# Patient Record
Sex: FEMALE | Race: WHITE | Employment: PART TIME | URBAN - METROPOLITAN AREA
[De-identification: names, ages, dates, MRNs, and addresses within clinical notes are randomized per-mention and may not be internally consistent; named-entity substitution may affect disease eponyms.]

---

## 2017-01-24 ENCOUNTER — OFFICE VISIT (OUTPATIENT)
Dept: INTERNAL MEDICINE CLINIC | Facility: CLINIC | Age: 37
End: 2017-01-24

## 2017-01-24 VITALS
WEIGHT: 152 LBS | DIASTOLIC BLOOD PRESSURE: 78 MMHG | BODY MASS INDEX: 23.86 KG/M2 | RESPIRATION RATE: 12 BRPM | TEMPERATURE: 98 F | HEIGHT: 67 IN | HEART RATE: 68 BPM | SYSTOLIC BLOOD PRESSURE: 112 MMHG

## 2017-01-24 DIAGNOSIS — L98.9 SKIN LESION: Primary | ICD-10-CM

## 2017-01-24 PROCEDURE — 99213 OFFICE O/P EST LOW 20 MIN: CPT | Performed by: INTERNAL MEDICINE

## 2017-01-24 NOTE — PROGRESS NOTES
Patient presents with:  Black Spots: on face pt states their becoming more noticeable. HPI:  Here for skin changes on face. Pt notes 2 increasing size and color skin lesions above left eye and on right face.    Pt has hx of crohn's and has been on immu skin lesions of face with normal borders light pigment, but are new per pt. A/P:    Skin lesion  (primary encounter diagnosis)  See dr Mik Cordero for eval  No orders of the defined types were placed in this encounter.        Meds & Refills for this Visi

## 2017-05-01 RX ORDER — CYANOCOBALAMIN 1000 UG/ML
INJECTION INTRAMUSCULAR; SUBCUTANEOUS
Qty: 1 ML | Refills: 0 | Status: SHIPPED | OUTPATIENT
Start: 2017-05-01 | End: 2017-05-29

## 2017-05-01 NOTE — TELEPHONE ENCOUNTER
LOV-1/24/17 AS  FOV-none  LAST RX-11/01/16 1ml 5 refills  LAST LABS-12/20/16  PER PROTOCOL- to provider

## 2017-05-16 ENCOUNTER — TELEPHONE (OUTPATIENT)
Dept: INTERNAL MEDICINE CLINIC | Facility: CLINIC | Age: 37
End: 2017-05-16

## 2017-05-16 ENCOUNTER — OFFICE VISIT (OUTPATIENT)
Dept: INTERNAL MEDICINE CLINIC | Facility: CLINIC | Age: 37
End: 2017-05-16

## 2017-05-16 ENCOUNTER — HOSPITAL ENCOUNTER (OUTPATIENT)
Dept: GENERAL RADIOLOGY | Facility: HOSPITAL | Age: 37
Discharge: HOME | End: 2017-05-16
Attending: INTERNAL MEDICINE
Payer: COMMERCIAL

## 2017-05-16 ENCOUNTER — LAB ENCOUNTER (OUTPATIENT)
Dept: LAB | Facility: HOSPITAL | Age: 37
End: 2017-05-16
Attending: INTERNAL MEDICINE
Payer: COMMERCIAL

## 2017-05-16 VITALS
HEART RATE: 64 BPM | RESPIRATION RATE: 16 BRPM | WEIGHT: 152 LBS | TEMPERATURE: 98 F | OXYGEN SATURATION: 98 % | BODY MASS INDEX: 23.86 KG/M2 | HEIGHT: 67 IN | DIASTOLIC BLOOD PRESSURE: 62 MMHG | SYSTOLIC BLOOD PRESSURE: 104 MMHG

## 2017-05-16 DIAGNOSIS — R06.02 SOB (SHORTNESS OF BREATH): Primary | ICD-10-CM

## 2017-05-16 DIAGNOSIS — R06.02 SOB (SHORTNESS OF BREATH): ICD-10-CM

## 2017-05-16 DIAGNOSIS — K51.919 ULCERATIVE COLITIS WITH COMPLICATION, UNSPECIFIED LOCATION (HCC): ICD-10-CM

## 2017-05-16 PROCEDURE — 85025 COMPLETE CBC W/AUTO DIFF WBC: CPT

## 2017-05-16 PROCEDURE — 85378 FIBRIN DEGRADE SEMIQUANT: CPT

## 2017-05-16 PROCEDURE — 36415 COLL VENOUS BLD VENIPUNCTURE: CPT

## 2017-05-16 PROCEDURE — 99214 OFFICE O/P EST MOD 30 MIN: CPT | Performed by: INTERNAL MEDICINE

## 2017-05-16 PROCEDURE — 93000 ELECTROCARDIOGRAM COMPLETE: CPT | Performed by: INTERNAL MEDICINE

## 2017-05-16 PROCEDURE — 71020 XR CHEST PA + LAT CHEST (CPT=71020): CPT | Performed by: INTERNAL MEDICINE

## 2017-05-16 PROCEDURE — 80053 COMPREHEN METABOLIC PANEL: CPT

## 2017-05-16 NOTE — TELEPHONE ENCOUNTER
Two episodes of SOB, Sunday and this morning, starting today with spasmotic coughing. Trouble catching her breath, and brief prickly feeling across the chest that went away. This has not happened before. No SOB or wheeze noted during our conversation.

## 2017-05-16 NOTE — PROGRESS NOTES
Patient presents with:  Pain: Pt. c/o discomfort in her chest that radiates down her back, SOB x 3 days      HPI:  Here for episode of chest tightness that radiated down her back that lasted moments on Sunday after she stood up quickly at a baseball game t No  Constitutional: Oriented to person, place, and time. No distress. HEENT:  Normocephalic and atraumatic. TM's wnl. Nose normal. Oropharynx is clear and moist.   Eyes: Conjunctivae wnl. Neck: Normal range of motion. Neck supple.  Normal carotid pulses

## 2017-05-18 ENCOUNTER — MED REC SCAN ONLY (OUTPATIENT)
Dept: INTERNAL MEDICINE CLINIC | Facility: CLINIC | Age: 37
End: 2017-05-18

## 2017-05-26 ENCOUNTER — HOSPITAL ENCOUNTER (OUTPATIENT)
Age: 37
Discharge: HOME | End: 2017-05-26
Attending: EMERGENCY MEDICINE
Payer: COMMERCIAL

## 2017-05-26 VITALS
HEIGHT: 67 IN | TEMPERATURE: 98 F | RESPIRATION RATE: 16 BRPM | BODY MASS INDEX: 22.76 KG/M2 | HEART RATE: 69 BPM | OXYGEN SATURATION: 97 % | DIASTOLIC BLOOD PRESSURE: 68 MMHG | WEIGHT: 145 LBS | SYSTOLIC BLOOD PRESSURE: 107 MMHG

## 2017-05-26 DIAGNOSIS — S61.211A LACERATION OF LEFT INDEX FINGER: Primary | ICD-10-CM

## 2017-05-26 PROCEDURE — 99213 OFFICE O/P EST LOW 20 MIN: CPT

## 2017-05-26 PROCEDURE — 90471 IMMUNIZATION ADMIN: CPT

## 2017-05-26 NOTE — ED PROVIDER NOTES
Patient Seen in: [facility]    History   Patient presents with:  Laceration Abrasion (integumentary)    Stated Complaint: left little finger cut x 1 day    HPI    26-year-old white female complaining of left fifth finger la Smokeless Status: Never Used                        Alcohol Use: Yes           1.0 - 1.5 oz/week       2-3 Cans of beer per week       Comment: 1/24/17      Review of Systems    Positive for stated complaint: left little finger cut x 1 day  Other systems a

## 2017-05-30 RX ORDER — CYANOCOBALAMIN 1000 UG/ML
INJECTION INTRAMUSCULAR; SUBCUTANEOUS
Qty: 1 ML | Refills: 0 | Status: SHIPPED | OUTPATIENT
Start: 2017-05-30 | End: 2017-09-07

## 2017-05-30 NOTE — TELEPHONE ENCOUNTER
LOV: 1/24/17  Future office visit: No upcoming visit   Last labs: 5/16/17 Cmp Cbc   Last RX: 5/1/17 #1mL  Per protocol: Route to provider

## 2017-06-13 ENCOUNTER — HOSPITAL ENCOUNTER (OUTPATIENT)
Dept: CV DIAGNOSTICS | Age: 37
Discharge: HOME | End: 2017-06-13
Attending: INTERNAL MEDICINE
Payer: COMMERCIAL

## 2017-06-13 DIAGNOSIS — K51.919 ULCERATIVE COLITIS WITH COMPLICATION, UNSPECIFIED LOCATION (HCC): ICD-10-CM

## 2017-06-13 DIAGNOSIS — R06.02 SOB (SHORTNESS OF BREATH): ICD-10-CM

## 2017-06-13 PROCEDURE — 93306 TTE W/DOPPLER COMPLETE: CPT | Performed by: INTERNAL MEDICINE

## 2017-07-03 NOTE — TELEPHONE ENCOUNTER
LOV: 5/16/17  Future office visit: No upcoming visit   Last labs: 5/16/17 Cmp Cbc   Last RX: 5/30/17 #1 mL No Refills  Per protocol: Route to provider

## 2017-07-05 RX ORDER — CYANOCOBALAMIN 1000 UG/ML
INJECTION INTRAMUSCULAR; SUBCUTANEOUS
Qty: 1 ML | Refills: 0 | Status: SHIPPED | OUTPATIENT
Start: 2017-07-05 | End: 2019-03-12

## 2017-08-02 RX ORDER — CYANOCOBALAMIN 1000 UG/ML
INJECTION INTRAMUSCULAR; SUBCUTANEOUS
Qty: 1 ML | Refills: 0 | Status: SHIPPED | OUTPATIENT
Start: 2017-08-02 | End: 2018-01-10

## 2017-08-10 ENCOUNTER — MED REC SCAN ONLY (OUTPATIENT)
Dept: INTERNAL MEDICINE CLINIC | Facility: CLINIC | Age: 37
End: 2017-08-10

## 2017-09-07 RX ORDER — CYANOCOBALAMIN 1000 UG/ML
INJECTION INTRAMUSCULAR; SUBCUTANEOUS
Qty: 4 ML | Refills: 0 | Status: SHIPPED | OUTPATIENT
Start: 2017-09-07 | End: 2018-05-14

## 2017-09-07 NOTE — TELEPHONE ENCOUNTER
Medication(s) to Refill:   Pending Prescriptions Disp Refills    cyanocobalamin 1000 MCG/ML Injection Solution 1 mL 0         Last Time Medication was Filled:  8-2-17 1 ml with no refills    Last Office Visit with PCP: 5/16/2017    When Patient was Due [facility]

## 2017-12-30 ENCOUNTER — HOSPITAL ENCOUNTER (OUTPATIENT)
Age: 37
Discharge: HOME | End: 2017-12-30
Attending: FAMILY MEDICINE
Payer: COMMERCIAL

## 2017-12-30 VITALS
RESPIRATION RATE: 18 BRPM | HEART RATE: 87 BPM | WEIGHT: 140 LBS | DIASTOLIC BLOOD PRESSURE: 82 MMHG | TEMPERATURE: 99 F | HEIGHT: 67 IN | OXYGEN SATURATION: 96 % | SYSTOLIC BLOOD PRESSURE: 121 MMHG | BODY MASS INDEX: 21.97 KG/M2

## 2017-12-30 DIAGNOSIS — R05.8 SPASMODIC COUGH: ICD-10-CM

## 2017-12-30 DIAGNOSIS — J11.1 FLU SYNDROME: Primary | ICD-10-CM

## 2017-12-30 PROCEDURE — 99213 OFFICE O/P EST LOW 20 MIN: CPT

## 2017-12-30 PROCEDURE — 99214 OFFICE O/P EST MOD 30 MIN: CPT

## 2017-12-30 RX ORDER — CODEINE PHOSPHATE AND GUAIFENESIN 10; 100 MG/5ML; MG/5ML
10 SOLUTION ORAL NIGHTLY PRN
Qty: 70 ML | Refills: 0 | Status: SHIPPED | OUTPATIENT
Start: 2017-12-30 | End: 2018-01-06

## 2017-12-30 RX ORDER — OSELTAMIVIR PHOSPHATE 75 MG/1
75 CAPSULE ORAL 2 TIMES DAILY
Qty: 10 CAPSULE | Refills: 0 | Status: SHIPPED | OUTPATIENT
Start: 2017-12-30 | End: 2018-01-04

## 2017-12-30 NOTE — ED INITIAL ASSESSMENT (HPI)
The patient is here with complaints of a cough and bilateral ear discomfort x 2 days. She states last night she woke up with a fever and chills but didn't take here temperature.   The cough is dry but she feels like she needs to bring mucus up but then has

## 2017-12-30 NOTE — ED PROVIDER NOTES
Patient Seen in: [facility]    History   Patient presents with:  Cough/URI  Ear Problem Pain (neurosensory)    Stated Complaint: Cough + ear pain x 2 days    HPI  43 yo F with hx of IBD here with complaints of cough and ear Pulse 87   Temp 99 °F (37.2 °C) (Temporal)   Resp 18   Ht 170.2 cm (5' 7\")   Wt 63.5 kg   LMP 12/04/2017 (Exact Date)   SpO2 96%   BMI 21.93 kg/m²     Physical Exam  GEN: Not in any acute distress, making good conversation, answering appropriately   SKIN least 5380-8154CT per day   Complications including the development of pneumonia, dehydration and PO intolerance discussed and if so to return immediately / go to the ER  Emphasized follow up with PMD in the next 5-7 days             Disposition and Plan

## 2018-01-10 ENCOUNTER — OFFICE VISIT (OUTPATIENT)
Dept: INTERNAL MEDICINE CLINIC | Facility: CLINIC | Age: 38
End: 2018-01-10

## 2018-01-10 VITALS
SYSTOLIC BLOOD PRESSURE: 108 MMHG | BODY MASS INDEX: 23.54 KG/M2 | OXYGEN SATURATION: 97 % | DIASTOLIC BLOOD PRESSURE: 76 MMHG | HEIGHT: 67 IN | WEIGHT: 150 LBS | HEART RATE: 78 BPM | TEMPERATURE: 98 F | RESPIRATION RATE: 16 BRPM

## 2018-01-10 DIAGNOSIS — J06.9 ACUTE URI: Primary | ICD-10-CM

## 2018-01-10 DIAGNOSIS — R05.9 COUGH: ICD-10-CM

## 2018-01-10 PROCEDURE — 99213 OFFICE O/P EST LOW 20 MIN: CPT | Performed by: NURSE PRACTITIONER

## 2018-01-10 RX ORDER — AMOXICILLIN AND CLAVULANATE POTASSIUM 875; 125 MG/1; MG/1
1 TABLET, FILM COATED ORAL 2 TIMES DAILY
Qty: 20 TABLET | Refills: 0 | Status: SHIPPED | OUTPATIENT
Start: 2018-01-10 | End: 2018-01-20

## 2018-01-10 NOTE — PROGRESS NOTES
Jean Luu is a 44year old female. Patient presents with:  Cough: Room 11. Congestion , body aches. HPI:   Presents for UC follow up   Seen 12/30 for cough and congestion. Given tamiflu and cheratussin ac   Not formally tested.    She is still oz/week     Cans of beer: 2 - 3 per week    Family History   Problem Relation Age of Onset   • Other [OTHER] Mother      autoimmune ITB   • Idiopathic Thrombocytopenic Purpura [OTHER] Mother    • Cancer Paternal Grandmother      Cervical   • Ovarian Cancer

## 2018-01-24 LAB
ABSOLUTE BASOPHILS: 21 CELLS/UL (ref 0–200)
ABSOLUTE EOSINOPHILS: 29 CELLS/UL (ref 15–500)
ABSOLUTE LYMPHOCYTES: 857 CELLS/UL (ref 850–3900)
ABSOLUTE MONOCYTES: 373 CELLS/UL (ref 200–950)
ABSOLUTE NEUTROPHILS: 2821 CELLS/UL (ref 1500–7800)
ALBUMIN/GLOBULIN RATIO: 1.8 (CALC) (ref 1–2.5)
ALBUMIN: 4.3 G/DL (ref 3.6–5.1)
ALKALINE PHOSPHATASE: 41 U/L (ref 33–115)
ALT: 11 U/L (ref 6–29)
AST: 18 U/L (ref 10–30)
BASOPHILS: 0.5 %
BILIRUBIN, TOTAL: 0.8 MG/DL (ref 0.2–1.2)
BUN: 17 MG/DL (ref 7–25)
CALCIUM: 9.3 MG/DL (ref 8.6–10.2)
CARBON DIOXIDE: 23 MMOL/L (ref 20–31)
CHLORIDE: 102 MMOL/L (ref 98–110)
CREATININE: 0.63 MG/DL (ref 0.5–1.1)
EGFR IF AFRICN AM: 132 ML/MIN/1.73M2
EGFR IF NONAFRICN AM: 114 ML/MIN/1.73M2
EOSINOPHILS: 0.7 %
GLOBULIN: 2.4 G/DL (CALC) (ref 1.9–3.7)
GLUCOSE: 85 MG/DL (ref 65–99)
HEMATOCRIT: 34.7 % (ref 35–45)
HEMOGLOBIN: 11.6 G/DL (ref 11.7–15.5)
LYMPHOCYTES: 20.9 %
MCH: 28.4 PG (ref 27–33)
MCHC: 33.4 G/DL (ref 32–36)
MCV: 85 FL (ref 80–100)
MONOCYTES: 9.1 %
MPV: 10.1 FL (ref 7.5–12.5)
NEUTROPHILS: 68.8 %
PLATELET COUNT: 245 THOUSAND/UL (ref 140–400)
POTASSIUM: 4.1 MMOL/L (ref 3.5–5.3)
PROTEIN, TOTAL: 6.7 G/DL (ref 6.1–8.1)
RDW: 13.6 % (ref 11–15)
RED BLOOD CELL COUNT: 4.08 MILLION/UL (ref 3.8–5.1)
SODIUM: 136 MMOL/L (ref 135–146)
WHITE BLOOD CELL COUNT: 4.1 THOUSAND/UL (ref 3.8–10.8)

## 2018-04-10 ENCOUNTER — PATIENT OUTREACH (OUTPATIENT)
Dept: INTERNAL MEDICINE CLINIC | Facility: CLINIC | Age: 38
End: 2018-04-10

## 2018-05-14 ENCOUNTER — OFFICE VISIT (OUTPATIENT)
Dept: OBGYN CLINIC | Facility: CLINIC | Age: 38
End: 2018-05-14

## 2018-05-14 VITALS
BODY MASS INDEX: 23.7 KG/M2 | WEIGHT: 151 LBS | SYSTOLIC BLOOD PRESSURE: 112 MMHG | DIASTOLIC BLOOD PRESSURE: 58 MMHG | HEIGHT: 67 IN

## 2018-05-14 DIAGNOSIS — Z12.4 SCREENING FOR MALIGNANT NEOPLASM OF CERVIX: ICD-10-CM

## 2018-05-14 DIAGNOSIS — Z30.40 ENCOUNTER FOR SURVEILLANCE OF CONTRACEPTIVES, UNSPECIFIED CONTRACEPTIVE: ICD-10-CM

## 2018-05-14 DIAGNOSIS — Z01.419 WELL WOMAN EXAM WITH ROUTINE GYNECOLOGICAL EXAM: Primary | ICD-10-CM

## 2018-05-14 DIAGNOSIS — N60.02 BREAST CYST, LEFT: ICD-10-CM

## 2018-05-14 PROCEDURE — 87624 HPV HI-RISK TYP POOLED RSLT: CPT | Performed by: OBSTETRICS & GYNECOLOGY

## 2018-05-14 PROCEDURE — 88175 CYTOPATH C/V AUTO FLUID REDO: CPT | Performed by: OBSTETRICS & GYNECOLOGY

## 2018-05-14 PROCEDURE — 99395 PREV VISIT EST AGE 18-39: CPT | Performed by: OBSTETRICS & GYNECOLOGY

## 2018-05-14 NOTE — PROGRESS NOTES
Beckie Allen is a 44year old female [id number] Patient's last menstrual period was 04/28/2018 (exact date). Patient presents with:  Wellness Visit  .      She had the Mirena with much improvement in her periods however she had elevated liver function tests N/A    Years of education: N/A  Number of children: N/A     Occupational History  None on file     Social History Main Topics   Smoking status: Never Smoker    Smokeless tobacco: Never Used    Alcohol use Yes  1.0 - 1.5 oz/week    2 - 3 Cans of beer per we discharge, vaginal itching  Skin/Breast:  Denies any breast pain, lumps, or discharge. Neurological:  denies headaches, extremity weakness or numbness.       PHYSICAL EXAM:   Constitutional: well developed, well nourished  Head/Face: normocephalic  Neck/T

## 2018-05-15 ENCOUNTER — TELEPHONE (OUTPATIENT)
Dept: INTERNAL MEDICINE CLINIC | Facility: CLINIC | Age: 38
End: 2018-05-15

## 2018-05-15 DIAGNOSIS — Z13.220 SCREENING FOR LIPOID DISORDERS: ICD-10-CM

## 2018-05-15 DIAGNOSIS — Z00.00 ROUTINE GENERAL MEDICAL EXAMINATION AT A HEALTH CARE FACILITY: Primary | ICD-10-CM

## 2018-05-15 DIAGNOSIS — Z13.29 SCREENING FOR THYROID DISORDER: ICD-10-CM

## 2018-05-15 NOTE — TELEPHONE ENCOUNTER
CPE   Future Appointments  Date Time Provider Tala Conley   5/17/2018 2:20 PM [facility] RM1 [address] [facility]   6/30/2018 7:30 AM Lamine Bose MD [facility] [facility]     Orders to [address] Herb Wallace aware must fast no call back required

## 2018-05-23 ENCOUNTER — TELEPHONE (OUTPATIENT)
Dept: OBGYN CLINIC | Facility: CLINIC | Age: 38
End: 2018-05-23

## 2018-05-23 NOTE — TELEPHONE ENCOUNTER
Heather Owens from Radiology called - needs corrected mammogram order, should be Bilateral diagnosis with Ultrasound

## 2018-05-23 NOTE — TELEPHONE ENCOUNTER
Radiology informed that per Dr. Autumn Lovett order only left breast diagnostic mammogram needs to be done due to a problem patient is having.

## 2018-05-25 ENCOUNTER — HOSPITAL ENCOUNTER (OUTPATIENT)
Dept: MAMMOGRAPHY | Facility: HOSPITAL | Age: 38
Discharge: HOME | End: 2018-05-25
Attending: OBSTETRICS & GYNECOLOGY
Payer: COMMERCIAL

## 2018-05-25 DIAGNOSIS — N60.02 BREAST CYST, LEFT: ICD-10-CM

## 2018-05-25 PROCEDURE — 77065 DX MAMMO INCL CAD UNI: CPT | Performed by: OBSTETRICS & GYNECOLOGY

## 2018-05-25 PROCEDURE — 76641 ULTRASOUND BREAST COMPLETE: CPT | Performed by: OBSTETRICS & GYNECOLOGY

## 2018-05-25 PROCEDURE — 77061 BREAST TOMOSYNTHESIS UNI: CPT | Performed by: OBSTETRICS & GYNECOLOGY

## 2018-05-29 NOTE — PROGRESS NOTES
Patient aware of results and recommendations to follow up with Dr. Mercedez Washington. Patient declines at this time. Patient instructed to call office if she changes her decision.  Patient verbalizes understanding

## 2018-05-30 NOTE — TELEPHONE ENCOUNTER
From: Damien Falk  Sent: 5/30/2018 2:12 PM CDT  Subject: Medication Renewal Request    Damien Mcgowan.  Deya would like a refill of the following medications:     BD LUER-MICHAEL SYRINGE 25G X 1\" 3 ML Does not apply Misc Tomasa Davey MD]    Preferred pharmac

## 2018-06-22 RX ORDER — CYANOCOBALAMIN 1000 UG/ML
INJECTION INTRAMUSCULAR; SUBCUTANEOUS
Qty: 4 ML | Refills: 0 | Status: SHIPPED | OUTPATIENT
Start: 2018-06-22 | End: 2018-11-02

## 2018-06-22 NOTE — TELEPHONE ENCOUNTER
LOV: 5/16/17 AS   Future office visit: 6/30/18 AS   Last labs: 1/23/18 Cmp Cbc   Last RX: Cyano 7/5/17 #1mL No Refills   Per protocol: Route to provider

## 2018-06-27 ENCOUNTER — LAB ENCOUNTER (OUTPATIENT)
Dept: LAB | Age: 38
End: 2018-06-27
Attending: NURSE PRACTITIONER
Payer: COMMERCIAL

## 2018-06-27 DIAGNOSIS — E53.8 LOW VITAMIN B12 LEVEL: ICD-10-CM

## 2018-06-27 DIAGNOSIS — Z00.00 ROUTINE GENERAL MEDICAL EXAMINATION AT A HEALTH CARE FACILITY: ICD-10-CM

## 2018-06-27 DIAGNOSIS — K75.4 AUTOIMMUNE HEPATITIS (HCC): ICD-10-CM

## 2018-06-27 DIAGNOSIS — K51.50 LEFT SIDED ULCERATIVE COLITIS WITHOUT COMPLICATION (HCC): ICD-10-CM

## 2018-06-27 DIAGNOSIS — Z13.29 SCREENING FOR THYROID DISORDER: ICD-10-CM

## 2018-06-27 DIAGNOSIS — Z13.220 SCREENING FOR LIPOID DISORDERS: ICD-10-CM

## 2018-06-27 PROCEDURE — 82607 VITAMIN B-12: CPT | Performed by: INTERNAL MEDICINE

## 2018-06-27 PROCEDURE — 80061 LIPID PANEL: CPT | Performed by: INTERNAL MEDICINE

## 2018-06-27 PROCEDURE — 84443 ASSAY THYROID STIM HORMONE: CPT | Performed by: INTERNAL MEDICINE

## 2018-06-30 ENCOUNTER — OFFICE VISIT (OUTPATIENT)
Dept: INTERNAL MEDICINE CLINIC | Facility: CLINIC | Age: 38
End: 2018-06-30

## 2018-06-30 VITALS
HEIGHT: 67 IN | HEART RATE: 64 BPM | BODY MASS INDEX: 24.01 KG/M2 | WEIGHT: 153 LBS | RESPIRATION RATE: 16 BRPM | DIASTOLIC BLOOD PRESSURE: 60 MMHG | SYSTOLIC BLOOD PRESSURE: 114 MMHG

## 2018-06-30 DIAGNOSIS — Z00.00 PE (PHYSICAL EXAM), ANNUAL: Primary | ICD-10-CM

## 2018-06-30 DIAGNOSIS — K51.919 ULCERATIVE COLITIS WITH COMPLICATION, UNSPECIFIED LOCATION (HCC): ICD-10-CM

## 2018-06-30 DIAGNOSIS — E53.8 LOW VITAMIN B12 LEVEL: ICD-10-CM

## 2018-06-30 DIAGNOSIS — D84.9 IMMUNOSUPPRESSION (HCC): ICD-10-CM

## 2018-06-30 DIAGNOSIS — D72.829 LEUKOCYTOSIS, UNSPECIFIED TYPE: ICD-10-CM

## 2018-06-30 PROCEDURE — 99395 PREV VISIT EST AGE 18-39: CPT | Performed by: INTERNAL MEDICINE

## 2018-06-30 NOTE — PROGRESS NOTES
Patient presents with:  Physical      HPI:  Here for cpe. Has livan UC, b12 def. Review of Systems   Constitutional: Negative for fever, chills and fatigue. No distress.   HENT: Negative for hearing loss, congestion, sore throat, neck pain and dental p Comment: Complete  10/6/08: COLONOSCOPY      Comment: w/biopsy  Nitza Mitchell MD  5/8/15: COLONOSCOPY      Comment: DR. Layne Lopez Q2YRS  08/03/2017: COLONOSCOPY      Comment: Andrea Mathew repeat in 3 years   5/8/15: UPPER GI ENDOSCOPY PERFORME Influenza Vaccine, No Preserv, 3YR +                          11/07/2016      TDAP                  05/26/2017        Physical Exam  /60   Pulse 64   Resp 16   Ht 67\"   Wt 153 lb   LMP 06/10/2018   BMI 23.96 kg/m²   Constitutional: Oriented to understands the plan.

## 2018-11-02 RX ORDER — CYANOCOBALAMIN 1000 UG/ML
INJECTION INTRAMUSCULAR; SUBCUTANEOUS
Qty: 4 ML | Refills: 0 | Status: SHIPPED | OUTPATIENT
Start: 2018-11-02 | End: 2018-12-20

## 2018-11-02 NOTE — TELEPHONE ENCOUNTER
LOV: 6/30/18 w/ AS for CPE  FOV: None  Last labs: 6/27/18 CMP,CBC,TSH,LIPID,VIT B12  Last Refill: 6/22/18 qt:4 mL    Per protocol sent to provider

## 2018-12-28 ENCOUNTER — LAB ENCOUNTER (OUTPATIENT)
Dept: LAB | Age: 38
End: 2018-12-28
Attending: INTERNAL MEDICINE
Payer: COMMERCIAL

## 2018-12-28 DIAGNOSIS — R15.2 RECTAL URGENCY: ICD-10-CM

## 2018-12-28 DIAGNOSIS — R19.7 DIARRHEA, UNSPECIFIED TYPE: ICD-10-CM

## 2018-12-28 PROCEDURE — 87493 C DIFF AMPLIFIED PROBE: CPT

## 2019-01-02 ENCOUNTER — HOSPITAL ENCOUNTER (INPATIENT)
Facility: HOSPITAL | Age: 39
LOS: 4 days | Discharge: HOME | DRG: 386 | End: 2019-01-06
Attending: EMERGENCY MEDICINE | Admitting: HOSPITALIST
Payer: COMMERCIAL

## 2019-01-02 ENCOUNTER — APPOINTMENT (OUTPATIENT)
Dept: CT IMAGING | Facility: HOSPITAL | Age: 39
DRG: 386 | End: 2019-01-02
Attending: PHYSICIAN ASSISTANT
Payer: COMMERCIAL

## 2019-01-02 DIAGNOSIS — K56.7 ILEUS OF UNSPECIFIED TYPE (HCC): ICD-10-CM

## 2019-01-02 DIAGNOSIS — K51.919 EXACERBATION OF ULCERATIVE COLITIS WITH COMPLICATION (HCC): Primary | ICD-10-CM

## 2019-01-02 DIAGNOSIS — K51.811 OTHER ULCERATIVE COLITIS WITH RECTAL BLEEDING (HCC): ICD-10-CM

## 2019-01-02 PROBLEM — E87.6 HYPOKALEMIA: Status: ACTIVE | Noted: 2019-01-02

## 2019-01-02 PROBLEM — R79.89 AZOTEMIA: Status: ACTIVE | Noted: 2019-01-02

## 2019-01-02 LAB
ALBUMIN SERPL-MCNC: 3.6 G/DL (ref 3.1–4.5)
ALBUMIN/GLOB SERPL: 1 {RATIO} (ref 1–2)
ALP LIVER SERPL-CCNC: 28 U/L (ref 37–98)
ALT SERPL-CCNC: 14 U/L (ref 14–54)
ANION GAP SERPL CALC-SCNC: 6 MMOL/L (ref 0–18)
AST SERPL-CCNC: 13 U/L (ref 15–41)
BASOPHILS # BLD AUTO: 0.01 X10(3) UL (ref 0–0.1)
BASOPHILS NFR BLD AUTO: 0.1 %
BILIRUB SERPL-MCNC: 0.6 MG/DL (ref 0.1–2)
BILIRUB UR QL STRIP.AUTO: NEGATIVE
BUN BLD-MCNC: 14 MG/DL (ref 8–20)
BUN/CREAT SERPL: 21.2 (ref 10–20)
CALCIUM BLD-MCNC: 8.9 MG/DL (ref 8.3–10.3)
CHLORIDE SERPL-SCNC: 105 MMOL/L (ref 101–111)
CLARITY UR REFRACT.AUTO: CLEAR
CO2 SERPL-SCNC: 28 MMOL/L (ref 22–32)
COLOR UR AUTO: YELLOW
CREAT BLD-MCNC: 0.66 MG/DL (ref 0.55–1.02)
EOSINOPHIL # BLD AUTO: 0.02 X10(3) UL (ref 0–0.3)
EOSINOPHIL NFR BLD AUTO: 0.2 %
ERYTHROCYTE [DISTWIDTH] IN BLOOD BY AUTOMATED COUNT: 13.1 % (ref 11.5–16)
GLOBULIN PLAS-MCNC: 3.5 G/DL (ref 2.8–4.4)
GLUCOSE BLD-MCNC: 90 MG/DL (ref 70–99)
GLUCOSE UR STRIP.AUTO-MCNC: NEGATIVE MG/DL
HCT VFR BLD AUTO: 39.5 % (ref 34–50)
HGB BLD-MCNC: 13.3 G/DL (ref 12–16)
IMMATURE GRANULOCYTE COUNT: 0.05 X10(3) UL (ref 0–1)
IMMATURE GRANULOCYTE RATIO %: 0.5 %
KETONES UR STRIP.AUTO-MCNC: NEGATIVE MG/DL
LEUKOCYTE ESTERASE UR QL STRIP.AUTO: NEGATIVE
LYMPHOCYTES # BLD AUTO: 1.04 X10(3) UL (ref 0.9–4)
LYMPHOCYTES NFR BLD AUTO: 11.2 %
M PROTEIN MFR SERPL ELPH: 7.1 G/DL (ref 6.4–8.2)
MCH RBC QN AUTO: 30.9 PG (ref 27–33.2)
MCHC RBC AUTO-ENTMCNC: 33.7 G/DL (ref 31–37)
MCV RBC AUTO: 91.6 FL (ref 81–100)
MONOCYTES # BLD AUTO: 0.91 X10(3) UL (ref 0.1–1)
MONOCYTES NFR BLD AUTO: 9.8 %
NEUTROPHIL ABS PRELIM: 7.28 X10 (3) UL (ref 1.3–6.7)
NEUTROPHILS # BLD AUTO: 7.28 X10(3) UL (ref 1.3–6.7)
NEUTROPHILS NFR BLD AUTO: 78.2 %
NITRITE UR QL STRIP.AUTO: NEGATIVE
OSMOLALITY SERPL CALC.SUM OF ELEC: 288 MOSM/KG (ref 275–295)
PH UR STRIP.AUTO: 7 [PH] (ref 4.5–8)
PLATELET # BLD AUTO: 288 10(3)UL (ref 150–450)
POTASSIUM SERPL-SCNC: 3.5 MMOL/L (ref 3.6–5.1)
PROT UR STRIP.AUTO-MCNC: NEGATIVE MG/DL
RBC # BLD AUTO: 4.31 X10(6)UL (ref 3.8–5.1)
RBC UR QL AUTO: NEGATIVE
RED CELL DISTRIBUTION WIDTH-SD: 44 FL (ref 35.1–46.3)
SODIUM SERPL-SCNC: 139 MMOL/L (ref 136–144)
SP GR UR STRIP.AUTO: 1.01 (ref 1–1.03)
UROBILINOGEN UR STRIP.AUTO-MCNC: <2 MG/DL
WBC # BLD AUTO: 9.3 X10(3) UL (ref 4–13)

## 2019-01-02 PROCEDURE — 99223 1ST HOSP IP/OBS HIGH 75: CPT | Performed by: HOSPITALIST

## 2019-01-02 PROCEDURE — 74177 CT ABD & PELVIS W/CONTRAST: CPT | Performed by: PHYSICIAN ASSISTANT

## 2019-01-02 RX ORDER — BALSALAZIDE DISODIUM 750 MG/1
2250 CAPSULE ORAL 2 TIMES DAILY
Status: DISCONTINUED | OUTPATIENT
Start: 2019-01-02 | End: 2019-01-03 | Stop reason: SDUPTHER

## 2019-01-02 RX ORDER — SODIUM CHLORIDE 9 MG/ML
INJECTION, SOLUTION INTRAVENOUS CONTINUOUS
Status: DISCONTINUED | OUTPATIENT
Start: 2019-01-02 | End: 2019-01-04

## 2019-01-02 RX ORDER — ACETAMINOPHEN 160 MG/5ML
650 SOLUTION ORAL EVERY 6 HOURS PRN
Status: DISCONTINUED | OUTPATIENT
Start: 2019-01-02 | End: 2019-01-02

## 2019-01-02 RX ORDER — AZATHIOPRINE 50 MG/1
75 TABLET ORAL DAILY
Status: DISCONTINUED | OUTPATIENT
Start: 2019-01-02 | End: 2019-01-03 | Stop reason: SDUPTHER

## 2019-01-02 RX ORDER — HYDROCORTISONE 100 MG/60ML
100 SUSPENSION RECTAL NIGHTLY
Status: DISCONTINUED | OUTPATIENT
Start: 2019-01-02 | End: 2019-01-03

## 2019-01-02 RX ORDER — ENOXAPARIN SODIUM 100 MG/ML
40 INJECTION SUBCUTANEOUS DAILY
Status: DISCONTINUED | OUTPATIENT
Start: 2019-01-03 | End: 2019-01-06

## 2019-01-02 RX ORDER — ENOXAPARIN SODIUM 100 MG/ML
40 INJECTION SUBCUTANEOUS DAILY
Status: DISCONTINUED | OUTPATIENT
Start: 2019-01-03 | End: 2019-01-02

## 2019-01-02 RX ORDER — ACETAMINOPHEN 325 MG/1
650 TABLET ORAL EVERY 6 HOURS PRN
Status: DISCONTINUED | OUTPATIENT
Start: 2019-01-02 | End: 2019-01-06

## 2019-01-02 NOTE — ED INITIAL ASSESSMENT (HPI)
C/o colitis flare up. Has been trying to manage at home but has had no relief. Sent here for IV steroids.

## 2019-01-03 ENCOUNTER — ANESTHESIA EVENT (OUTPATIENT)
Dept: ENDOSCOPY | Facility: HOSPITAL | Age: 39
End: 2019-01-03

## 2019-01-03 LAB
ANION GAP SERPL CALC-SCNC: 6 MMOL/L (ref 0–18)
BUN BLD-MCNC: 10 MG/DL (ref 8–20)
BUN/CREAT SERPL: 15.6 (ref 10–20)
CALCIUM BLD-MCNC: 8.3 MG/DL (ref 8.3–10.3)
CHLORIDE SERPL-SCNC: 107 MMOL/L (ref 101–111)
CO2 SERPL-SCNC: 28 MMOL/L (ref 22–32)
CREAT BLD-MCNC: 0.64 MG/DL (ref 0.55–1.02)
CRP SERPL-MCNC: <0.29 MG/DL (ref ?–1)
GLUCOSE BLD-MCNC: 159 MG/DL (ref 70–99)
OSMOLALITY SERPL CALC.SUM OF ELEC: 294 MOSM/KG (ref 275–295)
POTASSIUM SERPL-SCNC: 3.9 MMOL/L (ref 3.6–5.1)
SODIUM SERPL-SCNC: 141 MMOL/L (ref 136–144)

## 2019-01-03 PROCEDURE — 99232 SBSQ HOSP IP/OBS MODERATE 35: CPT | Performed by: HOSPITALIST

## 2019-01-03 RX ORDER — AZATHIOPRINE 50 MG/1
75 TABLET ORAL DAILY
Status: DISCONTINUED | OUTPATIENT
Start: 2019-01-03 | End: 2019-01-05

## 2019-01-03 RX ORDER — SODIUM PHOSPHATE, DIBASIC AND SODIUM PHOSPHATE, MONOBASIC 7; 19 G/133ML; G/133ML
1 ENEMA RECTAL ONCE
Status: COMPLETED | OUTPATIENT
Start: 2019-01-04 | End: 2019-01-04

## 2019-01-03 RX ORDER — BALSALAZIDE DISODIUM 750 MG/1
3000 CAPSULE ORAL 2 TIMES DAILY
Status: DISCONTINUED | OUTPATIENT
Start: 2019-01-03 | End: 2019-01-06

## 2019-01-03 RX ORDER — DIPHENHYDRAMINE HYDROCHLORIDE 50 MG/ML
25 INJECTION INTRAMUSCULAR; INTRAVENOUS ONCE
Status: COMPLETED | OUTPATIENT
Start: 2019-01-03 | End: 2019-01-03

## 2019-01-03 RX ORDER — SODIUM PHOSPHATE, DIBASIC AND SODIUM PHOSPHATE, MONOBASIC 7; 19 G/133ML; G/133ML
1 ENEMA RECTAL ONCE
Status: COMPLETED | OUTPATIENT
Start: 2019-01-03 | End: 2019-01-03

## 2019-01-03 NOTE — H&P
[facility]  History and Physical     Lizette Falk Patient Status:  Inpatient    1980 MRN [id number]   [facility] 3NW-A Attending Nicole Molina MD   Hosp Day # 0 PCP Dodie Vargas MD     Chief Complaint: Bloody diarrhea alcohol per week. She reports that she does not use drugs.     Family History:   Family History   Problem Relation Age of Onset   • Other (Other) Mother         autoimmune ITB   • Other (Idiopathic Thrombocytopenic Purpura) Mother    • Cancer Paternal Rindge DAYS Disp: 1 mL Rfl: 0       Review of Systems:   A comprehensive 14 point review of systems was completed. Pertinent positives and negatives noted in the HPI.     Physical Exam:    /90   Pulse 53   Temp 98.2 °F (36.8 °C) (Temporal)   Resp 16   Ht

## 2019-01-03 NOTE — ED PROVIDER NOTES
Patient Seen in: [facility] Emergency Department    History   Patient presents with:  Abdomen/Flank Pain (GI/)    Stated Complaint: colitis flair up    HPI    Chuyita Pipre is a 39-year-old female presents today for evaluation of a colitis flare.   She h 3 Cans of beer per week    Drug use: No      Review of Systems    Positive for stated complaint: colitis flair up  Other systems are as noted in HPI. Constitutional and vital signs reviewed.       All other systems reviewed and negative except as noted abo Neutrophil Absolute Prelim 7.28 (*)     Neutrophil Absolute 7.28 (*)     All other components within normal limits   URINALYSIS WITH CULTURE REFLEX   CBC WITH DIFFERENTIAL WITH PLATELET    Narrative:      The following orders were created for panel order CB ductal dilatation, or atrophy. SPLEEN:  No enlargement or focal lesion. KIDNEYS:  No mass, obstruction, or calcification. ADRENALS:  No mass or enlargement. AORTA/VASCULAR:  No aneurysm or dissection. RETROPERITONEUM:  No mass or adenopathy.   BOWEL/ME

## 2019-01-03 NOTE — PROGRESS NOTES
[facility]  Progress Note     Merced Falk Patient Status:  Inpatient    1980 MRN [id number]   [facility] 3NW-A Attending Samantha Azar, [address] [facility] Day # 1 PCP Joe Dodge MD     Chief Complaint: Abdominal pain    S: Redia Lank ASSESSMENT / PLAN:     1. Abdominal pain presumed secondary to UC flare  1. CRP WNL  2. CT without acute findings  3.  Management per GI    Quality:  · DVT Prophylaxis: lovenox  · CODE status: full  · [place]: no  · Central line: no    Estimated date of

## 2019-01-03 NOTE — H&P (VIEW-ONLY)
[facility]                       Gastroenterology Consultation-[facility]    Jason Falk Patient Status:  Inpatient    1980 MRN [id number]   [facility] 3NW-A Attending Samantha Rainey, [address] [facility] Day # 1 PCP Katerina Fitch this a.m. following her second dose of IV steroids.   PMHx:   Past Medical History:   Diagnosis Date   • Abdominal pain    • Celiac disease    • Colitis    • Hemorrhage of rectum and anus    • Low vitamin B12 level 2014    monthly b12 injections   • Pap sme illness            Cardiovascular: No history of CAD, prior MI, chest pain, or palpitations            Respiratory: No shortness of breath, asthma, copd, recurrent pneumonia            Hematologic: The patient reports no easy bruising, frequent gum bleedin affect without obvious depression or anxiety  Labs: Lab Results   Component Value Date    WBC 9.3 01/02/2019    HGB 13.3 01/02/2019    HCT 39.5 01/02/2019    .0 01/02/2019    CREATSERUM 0.64 01/03/2019    BUN 10 01/03/2019     01/03/2019    K or enlargement. AORTA/VASCULAR:  No aneurysm or dissection. RETROPERITONEUM:  No mass or adenopathy. BOWEL/MESENTERY:  Normal caliber small and large bowel. Stool throughout the colon. Debris within the small bowel suggests stasis.   No free flui Gastroenterology  [phone]    Attending physician addendum:     Ricardo Quintanilla is a 43year-old female who is being seen in consultation for bloody diarrhea and history of ulcerative colitis.      I personally saw and examined the patient.  I agree with t

## 2019-01-03 NOTE — CONSULTS
[facility]                       [facility]    Colleen Blanchard Deya Patient Status:  Inpatient    1980 MRN [id number]   [facility] 3NW-A Attending Brittanie Nolen, [address] [facility] Day # 1 PCP Marianna Voss this a.m. following her second dose of IV steroids.   PMHx:   Past Medical History:   Diagnosis Date   • Abdominal pain    • Celiac disease    • Colitis    • Hemorrhage of rectum and anus    • Low vitamin B12 level 2014    monthly b12 injections   • Pap sme illness            Cardiovascular: No history of CAD, prior MI, chest pain, or palpitations            Respiratory: No shortness of breath, asthma, copd, recurrent pneumonia            Hematologic: The patient reports no easy bruising, frequent gum bleedin affect without obvious depression or anxiety  Labs: Lab Results   Component Value Date    WBC 9.3 01/02/2019    HGB 13.3 01/02/2019    HCT 39.5 01/02/2019    .0 01/02/2019    CREATSERUM 0.64 01/03/2019    BUN 10 01/03/2019     01/03/2019    K or enlargement. AORTA/VASCULAR:  No aneurysm or dissection. RETROPERITONEUM:  No mass or adenopathy. BOWEL/MESENTERY:  Normal caliber small and large bowel. Stool throughout the colon. Debris within the small bowel suggests stasis.   No free flui Gastroenterology  [phone]    Attending physician addendum:     Raúl Leon is a 43year-old female who is being seen in consultation for bloody diarrhea and history of ulcerative colitis.      I personally saw and examined the patient.  I agree with t

## 2019-01-03 NOTE — ANESTHESIA PREPROCEDURE EVALUATION
PRE-OP EVALUATION    Patient Name: Silvia Falk    Pre-op Diagnosis: Other ulcerative colitis with rectal bleeding ([facility].) [K51.811]    Procedure(s):   FLEXIBLE SIGMOIDOSCOPY    Surgeon(s) and Role:     * Adarsh Rico, DO - Primary    Pre-op vitals revi reviewed.     Anesthetic Complications  (-) history of anesthetic complications         GI/Hepatic/Renal                          (+) ulcerative colitis       Cardiovascular        Exercise tolerance: good     MET: >4 Airway      Mallampati: II  Mouth opening: >3 FB  TM distance: > 6 cm  Neck ROM: full Cardiovascular      Rhythm: regular  Rate: normal     Dental             Pulmonary      Breath sounds clear to auscultation bilaterally.                Other fi

## 2019-01-04 ENCOUNTER — ANESTHESIA (OUTPATIENT)
Dept: ENDOSCOPY | Facility: HOSPITAL | Age: 39
End: 2019-01-04

## 2019-01-04 LAB
ANION GAP SERPL CALC-SCNC: 5 MMOL/L (ref 0–18)
BASOPHILS # BLD AUTO: 0 X10(3) UL (ref 0–0.1)
BASOPHILS NFR BLD AUTO: 0 %
BUN BLD-MCNC: 9 MG/DL (ref 8–20)
BUN/CREAT SERPL: 15.5 (ref 10–20)
CALCIUM BLD-MCNC: 8.4 MG/DL (ref 8.3–10.3)
CHLORIDE SERPL-SCNC: 107 MMOL/L (ref 101–111)
CO2 SERPL-SCNC: 29 MMOL/L (ref 22–32)
CREAT BLD-MCNC: 0.58 MG/DL (ref 0.55–1.02)
EOSINOPHIL # BLD AUTO: 0 X10(3) UL (ref 0–0.3)
EOSINOPHIL NFR BLD AUTO: 0 %
ERYTHROCYTE [DISTWIDTH] IN BLOOD BY AUTOMATED COUNT: 13 % (ref 11.5–16)
GLUCOSE BLD-MCNC: 133 MG/DL (ref 70–99)
HAV IGM SER QL: 2.3 MG/DL (ref 1.8–2.5)
HCG URINE QUALITATIVE: NEGATIVE
HCT VFR BLD AUTO: 35.7 % (ref 34–50)
HGB BLD-MCNC: 11.8 G/DL (ref 12–16)
IMMATURE GRANULOCYTE COUNT: 0.03 X10(3) UL (ref 0–1)
IMMATURE GRANULOCYTE RATIO %: 0.5 %
LYMPHOCYTES # BLD AUTO: 0.92 X10(3) UL (ref 0.9–4)
LYMPHOCYTES NFR BLD AUTO: 15.2 %
MCH RBC QN AUTO: 29.9 PG (ref 27–33.2)
MCHC RBC AUTO-ENTMCNC: 33.1 G/DL (ref 31–37)
MCV RBC AUTO: 90.4 FL (ref 81–100)
MONOCYTES # BLD AUTO: 0.37 X10(3) UL (ref 0.1–1)
MONOCYTES NFR BLD AUTO: 6.1 %
NEUTROPHIL ABS PRELIM: 4.74 X10 (3) UL (ref 1.3–6.7)
NEUTROPHILS # BLD AUTO: 4.74 X10(3) UL (ref 1.3–6.7)
NEUTROPHILS NFR BLD AUTO: 78.2 %
OSMOLALITY SERPL CALC.SUM OF ELEC: 293 MOSM/KG (ref 275–295)
PLATELET # BLD AUTO: 227 10(3)UL (ref 150–450)
POTASSIUM SERPL-SCNC: 3.4 MMOL/L (ref 3.6–5.1)
POTASSIUM SERPL-SCNC: 3.6 MMOL/L (ref 3.6–5.1)
RBC # BLD AUTO: 3.95 X10(6)UL (ref 3.8–5.1)
RED CELL DISTRIBUTION WIDTH-SD: 42.8 FL (ref 35.1–46.3)
SODIUM SERPL-SCNC: 141 MMOL/L (ref 136–144)
WBC # BLD AUTO: 6.1 X10(3) UL (ref 4–13)

## 2019-01-04 PROCEDURE — 99232 SBSQ HOSP IP/OBS MODERATE 35: CPT | Performed by: HOSPITALIST

## 2019-01-04 PROCEDURE — 0DBN8ZX EXCISION OF SIGMOID COLON, VIA NATURAL OR ARTIFICIAL OPENING ENDOSCOPIC, DIAGNOSTIC: ICD-10-PCS | Performed by: INTERNAL MEDICINE

## 2019-01-04 RX ORDER — ARIPIPRAZOLE 15 MG/1
40 TABLET ORAL EVERY 4 HOURS
Status: DISCONTINUED | OUTPATIENT
Start: 2019-01-05 | End: 2019-01-05

## 2019-01-04 RX ORDER — TEMAZEPAM 15 MG/1
15 CAPSULE ORAL NIGHTLY PRN
Status: DISCONTINUED | OUTPATIENT
Start: 2019-01-04 | End: 2019-01-04

## 2019-01-04 RX ORDER — ACETAMINOPHEN 500 MG
TABLET ORAL
Status: COMPLETED
Start: 2019-01-04 | End: 2019-01-04

## 2019-01-04 RX ORDER — MELATONIN
3 NIGHTLY PRN
Status: DISCONTINUED | OUTPATIENT
Start: 2019-01-04 | End: 2019-01-06

## 2019-01-04 RX ORDER — HYDROCORTISONE 100 MG/60ML
1 SUSPENSION RECTAL 2 TIMES DAILY
Status: COMPLETED | OUTPATIENT
Start: 2019-01-04 | End: 2019-01-04

## 2019-01-04 RX ORDER — POTASSIUM CHLORIDE 20 MEQ/1
40 TABLET, EXTENDED RELEASE ORAL EVERY 4 HOURS
Status: COMPLETED | OUTPATIENT
Start: 2019-01-04 | End: 2019-01-04

## 2019-01-04 RX ORDER — ACETAMINOPHEN 500 MG
1000 TABLET ORAL ONCE
Status: COMPLETED | OUTPATIENT
Start: 2019-01-04 | End: 2019-01-04

## 2019-01-04 RX ORDER — HYDROCORTISONE 100 MG/60ML
1 SUSPENSION RECTAL 2 TIMES DAILY
Status: DISCONTINUED | OUTPATIENT
Start: 2019-01-04 | End: 2019-01-06

## 2019-01-04 NOTE — PROGRESS NOTES
[facility]  Progress Note     Cassidy Falk Patient Status:  Inpatient    1980 MRN [id number]   [facility] 3NW-A Attending Radha Fernandez, [address] [facility] Day # 2 PCP Vinay Jeffrey MD     Chief Complaint: Abdominal pain    S: [place] Disodium  3,000 mg Oral BID   • azathioprine  75 mg Oral Daily   • hydrocortisone Na succinate PF  100 mg Intravenous Q8H [address]   • enoxaparin  40 mg Subcutaneous Daily       ASSESSMENT / PLAN:     1. Abdominal pain presumed secondary to UC flare  1.  Flex si

## 2019-01-04 NOTE — OPERATIVE REPORT
[address] Patient Status:  Inpatient    1980 MRN [id number]   [facility] ENDOSCOPY Attending Kristina Collins, [address] [facility] Day # 2 PCP Ivan Sandhu MD       PREOPERATIVE DIAGNOSIS/INDICATION: History of Ulcerative Colitis pre [facility]

## 2019-01-04 NOTE — PLAN OF CARE
Pt a&o, VSS. On RA, no sob noted. IVF infusing. Active bowel sounds, loose BMs. Enema prior to bedtime, reported pain, tylenol given. Benadryl for sleep per . NPO status discussed, verbalized understanding. Denies any other needs at this time.

## 2019-01-04 NOTE — INTERVAL H&P NOTE
Pre-op Diagnosis: Other ulcerative colitis with rectal bleeding [facility] [K51.811]    The above referenced H&P was reviewed by Calvin Tilley DO on 1/4/2019, the patient was examined and no significant changes have occurred in the patient's condition since

## 2019-01-04 NOTE — PROGRESS NOTES
Patient returned from Flex Sigmoidoscopy. Tolerated procedure well. Patient to start full liquids. Denies nausea/emesis.

## 2019-01-04 NOTE — ANESTHESIA POSTPROCEDURE EVALUATION
[facility] Patient Status:  Inpatient   Age/Gender 43year old female MRN [id number]   Location [facility]. Attending Robby Dobbins, [address] [facility] Day # 2 PCP Miri Espinal MD       Anesthesia Post-op Note    Procedure(s):

## 2019-01-05 LAB — POTASSIUM SERPL-SCNC: 3.9 MMOL/L (ref 3.6–5.1)

## 2019-01-05 PROCEDURE — 99232 SBSQ HOSP IP/OBS MODERATE 35: CPT | Performed by: HOSPITALIST

## 2019-01-05 RX ORDER — AZATHIOPRINE 50 MG/1
75 TABLET ORAL NIGHTLY
Status: DISCONTINUED | OUTPATIENT
Start: 2019-01-05 | End: 2019-01-06

## 2019-01-05 RX ORDER — POTASSIUM CHLORIDE 20 MEQ/1
40 TABLET, EXTENDED RELEASE ORAL ONCE
Status: COMPLETED | OUTPATIENT
Start: 2019-01-05 | End: 2019-01-05

## 2019-01-05 RX ORDER — DIPHENHYDRAMINE HCL 25 MG
25 CAPSULE ORAL NIGHTLY PRN
Status: DISCONTINUED | OUTPATIENT
Start: 2019-01-05 | End: 2019-01-06

## 2019-01-05 NOTE — PLAN OF CARE
Upon reassessment this afternoon, VSS, HR 40's. Dr. Zoë Farrell aware, no new orders. Pt continues to tolerate full liquid diet without nausea and cramping. Denies abdominal pain.  Pt reports 2 loose, brown w/scant blood BM's since this am. IV steroid given as

## 2019-01-05 NOTE — PROGRESS NOTES
1953: Dr. Rajesh Bautista paged regarding hydrocortisone enemas. Order received to continue BID. Will implement.

## 2019-01-05 NOTE — PROGRESS NOTES
Gastroenterology Follow-Up Note      Miguelina Mariammitra Falk Patient Status:  Inpatient    1980 MRN [id number]   [facility] 3NW-A Attending Libia Jones, [address] [facility] Day # 3 PCP Juvenal Whittington MD     Chief Complaint/Reason for Follow Up: 3

## 2019-01-05 NOTE — PROGRESS NOTES
[facility]  Progress Note     Catalnio Carlee Falk Patient Status:  Inpatient    1980 MRN [id number]   [facility] 3NW-A Attending Michelle Pizarro, [address] [facility] Day # 3 PCP Michel Lee MD     Chief Complaint: Abdominal pain    S: Yessi Nicole results for input(s): PTP, INR in the last 168 hours. No results for input(s): TROP, CK in the last 168 hours. Imaging: Imaging data reviewed in Epic.     Medications:   • azathioprine  75 mg Oral Nightly   • hydrocortisone  1 enema Rectal BID

## 2019-01-06 VITALS
HEART RATE: 42 BPM | DIASTOLIC BLOOD PRESSURE: 79 MMHG | WEIGHT: 150 LBS | BODY MASS INDEX: 23.54 KG/M2 | HEIGHT: 67 IN | SYSTOLIC BLOOD PRESSURE: 131 MMHG | TEMPERATURE: 98 F | RESPIRATION RATE: 16 BRPM | OXYGEN SATURATION: 99 %

## 2019-01-06 LAB
CALPROTECTIN, FECAL: 910 UG/G
HBV CORE AB SERPL QL IA: NONREACTIVE
HBV SURFACE AB SER QL: NONREACTIVE
HBV SURFACE AB SERPL IA-ACNC: 7.53 MIU/ML
HBV SURFACE AG SER-ACNC: <0.1 [IU]/L
HBV SURFACE AG SERPL QL IA: NONREACTIVE

## 2019-01-06 PROCEDURE — 99232 SBSQ HOSP IP/OBS MODERATE 35: CPT | Performed by: HOSPITALIST

## 2019-01-06 RX ORDER — HYDROCORTISONE 100 MG/60ML
100 SUSPENSION RECTAL 2 TIMES DAILY
Qty: 60 ENEMA | Refills: 0 | Status: SHIPPED | OUTPATIENT
Start: 2019-01-06 | End: 2019-01-10

## 2019-01-06 RX ORDER — PREDNISONE 10 MG/1
10 TABLET ORAL DAILY
Qty: 70 TABLET | Refills: 0 | Status: SHIPPED | OUTPATIENT
Start: 2019-01-06 | End: 2019-03-12

## 2019-01-06 NOTE — PROGRESS NOTES
Gastroenterology Follow-Up Note      Miguelina Mariammitra Falk Patient Status:  Inpatient    1980 MRN [id number]   [facility] 3NW-A Attending Libia Jones, [address] [facility] Day # 4 PCP Juvenal Whittington MD     Chief Complaint/Reason for Follow Up: 3

## 2019-01-06 NOTE — PROGRESS NOTES
[facility] [address]  [place], [address]  ?  01/06/19  ? Re: Catalino Falk  ? To Whom It May Concern:    Catalino Falk was admitted to [facility] from 1/2/2019 to 01/06/19.     Please excuse Haider Chaney from attending work

## 2019-01-06 NOTE — DISCHARGE SUMMARY
[facility] [place] [facility]  DISCHARGE SUMMARY     Nancy Falk Patient Status:  Inpatient    1980 MRN [id number]   [facility] 3NW-A Attending Jaja Rodriguez, [address] [facility] Day # 4 PCP Yolanda Sauceda MD     Date of Admission: 2019  Date of Dis known as:  Alejandra Sky  What changed:  when to take this      Place 1 enema (100 mg total) rectally 2 (two) times daily.    Quantity:  60 enema  Refills:  0     predniSONE 10 MG Tabs  Commonly known as:  DELTASONE  What changed:    · medication strength  · ho Date Arrival Time Visit Type Length Department Provider     1/10/2019  1:40 PM  FOLLOW-UP OV [24066] 20 min.  [facility] Gastroenterology,  Tj Richey MD    Patient Instructions:      Please arrive 15 minutes prior to your scheduled appointment

## 2019-01-09 ENCOUNTER — TELEPHONE (OUTPATIENT)
Dept: INTERNAL MEDICINE CLINIC | Facility: CLINIC | Age: 39
End: 2019-01-09

## 2019-01-09 NOTE — TELEPHONE ENCOUNTER
Received medical records request from [facility]/Gastroenterology/Dr. Yahaira Gautam. Sent to [employer] for processing.

## 2019-01-10 LAB
M TB TUBERC IFN-G/MITOGEN IGNF BLD: 0 IU/ML
M TB TUBERC IFN-G/MITOGEN IGNF BLD: 0 IU/ML
M TB TUBERC IGNF/MITOGEN IGNF CONTROL: 0.15 IU/ML
MITOGEN IGNF BCKGRD COR BLD-ACNC: 0.02 IU/ML

## 2019-01-11 ENCOUNTER — HOSPITAL ENCOUNTER (OUTPATIENT)
Dept: GENERAL RADIOLOGY | Age: 39
Discharge: HOME | End: 2019-01-11
Attending: INTERNAL MEDICINE
Payer: COMMERCIAL

## 2019-01-11 ENCOUNTER — APPOINTMENT (OUTPATIENT)
Dept: LAB | Age: 39
End: 2019-01-11
Attending: INTERNAL MEDICINE
Payer: COMMERCIAL

## 2019-01-11 DIAGNOSIS — K51.011 ULCERATIVE PANCOLITIS WITH RECTAL BLEEDING (HCC): ICD-10-CM

## 2019-01-11 PROCEDURE — 86480 TB TEST CELL IMMUN MEASURE: CPT

## 2019-01-11 PROCEDURE — 71046 X-RAY EXAM CHEST 2 VIEWS: CPT | Performed by: INTERNAL MEDICINE

## 2019-01-11 PROCEDURE — 36415 COLL VENOUS BLD VENIPUNCTURE: CPT

## 2019-01-15 LAB
M TB TUBERC IFN-G/MITOGEN IGNF BLD: 0 IU/ML
M TB TUBERC IFN-G/MITOGEN IGNF BLD: 0 IU/ML
M TB TUBERC IGNF/MITOGEN IGNF CONTROL: 0.12 IU/ML
MITOGEN IGNF BCKGRD COR BLD-ACNC: 0.02 IU/ML

## 2019-02-02 ENCOUNTER — HOSPITAL ENCOUNTER (EMERGENCY)
Facility: HOSPITAL | Age: 39
Discharge: HOME | End: 2019-02-03
Attending: STUDENT IN AN ORGANIZED HEALTH CARE EDUCATION/TRAINING PROGRAM
Payer: COMMERCIAL

## 2019-02-02 ENCOUNTER — APPOINTMENT (OUTPATIENT)
Dept: GENERAL RADIOLOGY | Facility: HOSPITAL | Age: 39
End: 2019-02-02
Attending: STUDENT IN AN ORGANIZED HEALTH CARE EDUCATION/TRAINING PROGRAM
Payer: COMMERCIAL

## 2019-02-02 VITALS
DIASTOLIC BLOOD PRESSURE: 79 MMHG | TEMPERATURE: 99 F | HEIGHT: 67 IN | RESPIRATION RATE: 18 BRPM | HEART RATE: 80 BPM | SYSTOLIC BLOOD PRESSURE: 122 MMHG | BODY MASS INDEX: 22.76 KG/M2 | OXYGEN SATURATION: 96 % | WEIGHT: 145 LBS

## 2019-02-02 DIAGNOSIS — S52.124A CLOSED NONDISPLACED FRACTURE OF HEAD OF RIGHT RADIUS, INITIAL ENCOUNTER: Primary | ICD-10-CM

## 2019-02-02 PROCEDURE — 99284 EMERGENCY DEPT VISIT MOD MDM: CPT

## 2019-02-02 PROCEDURE — 73060 X-RAY EXAM OF HUMERUS: CPT | Performed by: STUDENT IN AN ORGANIZED HEALTH CARE EDUCATION/TRAINING PROGRAM

## 2019-02-02 PROCEDURE — 73080 X-RAY EXAM OF ELBOW: CPT | Performed by: STUDENT IN AN ORGANIZED HEALTH CARE EDUCATION/TRAINING PROGRAM

## 2019-02-02 PROCEDURE — 29105 APPLICATION LONG ARM SPLINT: CPT

## 2019-02-02 RX ORDER — HYDROCODONE BITARTRATE AND ACETAMINOPHEN 5; 325 MG/1; MG/1
1 TABLET ORAL ONCE
Status: COMPLETED | OUTPATIENT
Start: 2019-02-02 | End: 2019-02-02

## 2019-02-02 RX ORDER — HYDROCODONE BITARTRATE AND ACETAMINOPHEN 5; 325 MG/1; MG/1
1-2 TABLET ORAL EVERY 4 HOURS PRN
Qty: 25 TABLET | Refills: 0 | Status: SHIPPED | OUTPATIENT
Start: 2019-02-02 | End: 2019-02-09

## 2019-02-03 NOTE — ED NOTES
Received pt [facility]. Pt return from xray without complications.

## 2019-02-03 NOTE — ED INITIAL ASSESSMENT (HPI)
Pt here for evaluation of right arm injury. Pt was walking her dog and slipped on ice and fell on right outstretched arm.  +swelling above right elbow  CSM intact.  +right radial pulse

## 2019-02-03 NOTE — ED PROVIDER NOTES
Patient Seen in: [facility] Emergency Department    History   Patient presents with:  Upper Extremity Injury (musculoskeletal)    Stated Complaint: fell and injured right arm    HPI    Patient is a 13-year-old female who presents emergency departmen vital signs reviewed. All other systems reviewed and negative except as noted above.     Physical Exam     ED Triage Vitals [02/02/19 2204]   /79   Pulse 80   Resp 18   Temp 98.6 °F (37 °C)   Temp src Temporal   SpO2 96 %   O2 Device None (Room a Tab  Take 1-2 tablets by mouth every 4 (four) hours as needed for Pain.   Qty: 25 tablet Refills: 0

## 2019-02-04 PROBLEM — S52.124A CLOSED NONDISPLACED FRACTURE OF HEAD OF RIGHT RADIUS, INITIAL ENCOUNTER: Status: ACTIVE | Noted: 2019-02-04

## 2019-02-25 RX ORDER — CYANOCOBALAMIN 1000 UG/ML
INJECTION INTRAMUSCULAR; SUBCUTANEOUS
Qty: 4 ML | Refills: 0 | Status: SHIPPED | OUTPATIENT
Start: 2019-02-25 | End: 2019-03-12

## 2019-02-25 NOTE — TELEPHONE ENCOUNTER
LOV: 6/30/18 w/ AS for CPE  FOV: None  Last labs: 6/27/18 VIT B12,LIPID,TSH,CBC,CMP  Last Refill: 11/2/18 qt:4 mL    Per protocol routed to provider

## 2019-02-26 ENCOUNTER — TELEPHONE (OUTPATIENT)
Dept: INTERNAL MEDICINE CLINIC | Facility: CLINIC | Age: 39
End: 2019-02-26

## 2019-03-05 ENCOUNTER — MED REC SCAN ONLY (OUTPATIENT)
Dept: INTERNAL MEDICINE CLINIC | Facility: CLINIC | Age: 39
End: 2019-03-05

## 2019-03-12 PROBLEM — D50.9 IRON DEFICIENCY ANEMIA: Status: ACTIVE | Noted: 2019-03-12

## 2019-04-22 ENCOUNTER — MED REC SCAN ONLY (OUTPATIENT)
Dept: INTERNAL MEDICINE CLINIC | Facility: CLINIC | Age: 39
End: 2019-04-22

## 2019-04-25 ENCOUNTER — PATIENT MESSAGE (OUTPATIENT)
Dept: OBGYN CLINIC | Facility: CLINIC | Age: 39
End: 2019-04-25

## 2019-04-25 ENCOUNTER — TELEPHONE (OUTPATIENT)
Dept: INTERNAL MEDICINE CLINIC | Facility: CLINIC | Age: 39
End: 2019-04-25

## 2019-04-25 DIAGNOSIS — Z12.31 ENCOUNTER FOR SCREENING MAMMOGRAM FOR BREAST CANCER: Primary | ICD-10-CM

## 2019-04-25 NOTE — TELEPHONE ENCOUNTER
Hep B and Pneumonia Shot    It was recommended byt Pt GI doctor to get the immunizations Is on a new medication. Pt is due for CPE in June with AS but does not want to schedule at this time.      Future Appointments   Date Time Provider Department Cent

## 2019-04-30 ENCOUNTER — NURSE ONLY (OUTPATIENT)
Dept: INTERNAL MEDICINE CLINIC | Facility: CLINIC | Age: 39
End: 2019-04-30
Payer: COMMERCIAL

## 2019-04-30 PROCEDURE — 90472 IMMUNIZATION ADMIN EACH ADD: CPT | Performed by: INTERNAL MEDICINE

## 2019-04-30 PROCEDURE — 90471 IMMUNIZATION ADMIN: CPT | Performed by: INTERNAL MEDICINE

## 2019-04-30 PROCEDURE — 90746 HEPB VACCINE 3 DOSE ADULT IM: CPT | Performed by: INTERNAL MEDICINE

## 2019-04-30 PROCEDURE — 90670 PCV13 VACCINE IM: CPT | Performed by: INTERNAL MEDICINE

## 2019-05-17 ENCOUNTER — HOSPITAL ENCOUNTER (EMERGENCY)
Facility: HOSPITAL | Age: 39
Discharge: HOME | End: 2019-05-18
Attending: EMERGENCY MEDICINE
Payer: COMMERCIAL

## 2019-05-17 ENCOUNTER — APPOINTMENT (OUTPATIENT)
Dept: ULTRASOUND IMAGING | Facility: HOSPITAL | Age: 39
End: 2019-05-17
Attending: PHYSICIAN ASSISTANT
Payer: COMMERCIAL

## 2019-05-17 VITALS
DIASTOLIC BLOOD PRESSURE: 73 MMHG | OXYGEN SATURATION: 99 % | HEART RATE: 79 BPM | TEMPERATURE: 97 F | RESPIRATION RATE: 16 BRPM | SYSTOLIC BLOOD PRESSURE: 110 MMHG

## 2019-05-17 DIAGNOSIS — R10.13 EPIGASTRIC PAIN: Primary | ICD-10-CM

## 2019-05-17 DIAGNOSIS — R11.2 NAUSEA AND VOMITING IN ADULT: ICD-10-CM

## 2019-05-17 PROCEDURE — 80053 COMPREHEN METABOLIC PANEL: CPT | Performed by: EMERGENCY MEDICINE

## 2019-05-17 PROCEDURE — 81003 URINALYSIS AUTO W/O SCOPE: CPT | Performed by: EMERGENCY MEDICINE

## 2019-05-17 PROCEDURE — 96360 HYDRATION IV INFUSION INIT: CPT

## 2019-05-17 PROCEDURE — 83690 ASSAY OF LIPASE: CPT | Performed by: EMERGENCY MEDICINE

## 2019-05-17 PROCEDURE — 99284 EMERGENCY DEPT VISIT MOD MDM: CPT

## 2019-05-17 PROCEDURE — 81025 URINE PREGNANCY TEST: CPT

## 2019-05-17 PROCEDURE — 85025 COMPLETE CBC W/AUTO DIFF WBC: CPT | Performed by: EMERGENCY MEDICINE

## 2019-05-17 PROCEDURE — 76700 US EXAM ABDOM COMPLETE: CPT | Performed by: PHYSICIAN ASSISTANT

## 2019-05-17 PROCEDURE — 96361 HYDRATE IV INFUSION ADD-ON: CPT

## 2019-05-17 RX ORDER — MAGNESIUM HYDROXIDE/ALUMINUM HYDROXICE/SIMETHICONE 120; 1200; 1200 MG/30ML; MG/30ML; MG/30ML
30 SUSPENSION ORAL ONCE
Status: COMPLETED | OUTPATIENT
Start: 2019-05-17 | End: 2019-05-17

## 2019-05-17 RX ORDER — ONDANSETRON 4 MG/1
4 TABLET, ORALLY DISINTEGRATING ORAL EVERY 8 HOURS PRN
Qty: 10 TABLET | Refills: 0 | Status: SHIPPED | OUTPATIENT
Start: 2019-05-17 | End: 2019-05-24

## 2019-05-17 RX ORDER — ONDANSETRON 2 MG/ML
4 INJECTION INTRAMUSCULAR; INTRAVENOUS ONCE
Status: DISCONTINUED | OUTPATIENT
Start: 2019-05-17 | End: 2019-05-18

## 2019-05-18 NOTE — ED PROVIDER NOTES
Patient Seen in: [facility] Emergency Department    History   Patient presents with:  Nausea/Vomiting/Diarrhea (gastrointestinal)    Stated Complaint: N/V    HPI    CHIEF COMPLAINT: Nausea and vomiting, epigastric pain     HISTORY OF PRESENT ILLNESS history is reviewed and is noncontributory to the presenting problem, except as indicated as above.     Past Medical History:   Diagnosis Date   • Abdominal pain    • Celiac disease    • Colitis    • Hemorrhage of rectum and anus    • Low vitamin B12 level tonsillar hypertrophy. no trismus or stridor. Uvula midline. No phonation changes, patient handling secretions well.  Mucous membranes moist.  Respiratory: there are no retractions, lungs are clear to auscultation  Cardiovascular: regular rate and rhythm RAINBOW DRAW BLUE   RAINBOW DRAW LAVENDER   RAINBOW DRAW LIGHT GREEN   RAINBOW DRAW GOLD          Us Abdomen Complete (cpt=76700)    Result Date: 5/17/2019  PROCEDURE:  US ABDOMEN COMPLETE (CPT=76700)  COMPARISON:  None.   INDICATIONS:  nausea and vomitin disposition and plan.             Disposition and Plan     Clinical Impression:  Epigastric pain  (primary encounter diagnosis)  Nausea and vomiting in adult    Disposition:  Discharge  5/17/2019 11:53 pm    Follow-up:  Jose Severino MD  [address] [facility]

## 2019-05-18 NOTE — ED INITIAL ASSESSMENT (HPI)
Pt ambulatory to re cc n/v on Monday then resolved  tonoc n/v/d started at 1800 took IBD meds and takes antib 3x week  [facility]  States mid abd pain since vomiting at 1800 tonoc

## 2019-06-03 ENCOUNTER — TELEPHONE (OUTPATIENT)
Dept: INTERNAL MEDICINE CLINIC | Facility: CLINIC | Age: 39
End: 2019-06-03

## 2019-06-03 DIAGNOSIS — Z23 NEED FOR HEPATITIS B BOOSTER VACCINATION: Primary | ICD-10-CM

## 2019-06-03 NOTE — TELEPHONE ENCOUNTER
Pt called wanting to know when she is due for #2 Hep Binj and if we could please put order in and call her back to sched

## 2019-06-03 NOTE — TELEPHONE ENCOUNTER
Due 5/30/19 for #2 Hep B. Pended vaccine. Ok to order? LOV 6/30/18 with AS. 1/6/19 Hep B surface antibody nonreactive.

## 2019-06-06 ENCOUNTER — TELEPHONE (OUTPATIENT)
Dept: OBGYN CLINIC | Facility: CLINIC | Age: 39
End: 2019-06-06

## 2019-06-10 ENCOUNTER — HOSPITAL ENCOUNTER (OUTPATIENT)
Dept: MAMMOGRAPHY | Facility: HOSPITAL | Age: 39
Discharge: HOME | End: 2019-06-10
Attending: OBSTETRICS & GYNECOLOGY
Payer: COMMERCIAL

## 2019-06-10 DIAGNOSIS — Z12.31 ENCOUNTER FOR SCREENING MAMMOGRAM FOR BREAST CANCER: ICD-10-CM

## 2019-06-10 PROCEDURE — 77067 SCR MAMMO BI INCL CAD: CPT | Performed by: OBSTETRICS & GYNECOLOGY

## 2019-06-10 PROCEDURE — 77063 BREAST TOMOSYNTHESIS BI: CPT | Performed by: OBSTETRICS & GYNECOLOGY

## 2019-06-24 RX ORDER — CYANOCOBALAMIN 1000 UG/ML
INJECTION INTRAMUSCULAR; SUBCUTANEOUS
Qty: 4 ML | Refills: 0 | Status: SHIPPED | OUTPATIENT
Start: 2019-06-24 | End: 2020-03-23

## 2019-06-24 NOTE — TELEPHONE ENCOUNTER
Last Office Visit: 6-30-18 with AS for cpe   Last Rx Filled:   Last Labs: 6-27-18 vitamin B12  Future Appointment: none    Per protocol to provider

## 2019-07-16 ENCOUNTER — OFFICE VISIT (OUTPATIENT)
Dept: INTERNAL MEDICINE CLINIC | Facility: CLINIC | Age: 39
End: 2019-07-16
Payer: COMMERCIAL

## 2019-07-16 VITALS
DIASTOLIC BLOOD PRESSURE: 72 MMHG | HEART RATE: 64 BPM | RESPIRATION RATE: 14 BRPM | WEIGHT: 149 LBS | BODY MASS INDEX: 23.39 KG/M2 | HEIGHT: 67 IN | SYSTOLIC BLOOD PRESSURE: 110 MMHG

## 2019-07-16 DIAGNOSIS — K90.0 CELIAC DISEASE: ICD-10-CM

## 2019-07-16 DIAGNOSIS — K51.20 CHRONIC ULCERATIVE PROCTITIS WITHOUT COMPLICATIONS (HCC): ICD-10-CM

## 2019-07-16 DIAGNOSIS — D84.9 IMMUNOSUPPRESSION (HCC): ICD-10-CM

## 2019-07-16 DIAGNOSIS — R22.32 MASS OF LEFT UPPER EXTREMITY: Primary | ICD-10-CM

## 2019-07-16 DIAGNOSIS — S52.124A CLOSED NONDISPLACED FRACTURE OF HEAD OF RIGHT RADIUS, INITIAL ENCOUNTER: ICD-10-CM

## 2019-07-16 PROCEDURE — 99213 OFFICE O/P EST LOW 20 MIN: CPT | Performed by: NURSE PRACTITIONER

## 2019-07-16 PROCEDURE — 90471 IMMUNIZATION ADMIN: CPT | Performed by: NURSE PRACTITIONER

## 2019-07-16 PROCEDURE — 90746 HEPB VACCINE 3 DOSE ADULT IM: CPT | Performed by: NURSE PRACTITIONER

## 2019-07-16 NOTE — PROGRESS NOTES
Nicola Bejarano is a 35year old female. Patient presents with:  Lump: Pt noticed a lump on L forearm. LB-rm 10      HPI:   Presents for eval of lump noted on left forearm. Hit her arm on the steering wheel and noted tenderness.   Upon palpation, noted lum Pap smear for cervical cancer screening 07/24/2015    negative   • Plantar warts    • Ulcerative colitis ([facility] [address]    • Wears glasses       Social History:  Social History    Tobacco Use      Smoking status: Never Smoker      Smokeless tobacco: Never Used    A Placed This Encounter      Hep B, Adult (93069) (DxV05.3/Z23)      Meds & Refills for this Visit:  Requested Prescriptions      No prescriptions requested or ordered in this encounter       Imaging & Consults:  HEPATITIS B VACCINE, OVER 20  XR DEXA BONE DE

## 2019-07-17 ENCOUNTER — HOSPITAL ENCOUNTER (OUTPATIENT)
Dept: BONE DENSITY | Age: 39
Discharge: HOME | End: 2019-07-17
Attending: NURSE PRACTITIONER
Payer: COMMERCIAL

## 2019-07-17 ENCOUNTER — HOSPITAL ENCOUNTER (OUTPATIENT)
Dept: ULTRASOUND IMAGING | Facility: HOSPITAL | Age: 39
Discharge: HOME | End: 2019-07-17
Attending: NURSE PRACTITIONER
Payer: COMMERCIAL

## 2019-07-17 DIAGNOSIS — K90.0 CELIAC DISEASE: ICD-10-CM

## 2019-07-17 DIAGNOSIS — D84.9 IMMUNOSUPPRESSION (HCC): ICD-10-CM

## 2019-07-17 DIAGNOSIS — K51.20 CHRONIC ULCERATIVE PROCTITIS WITHOUT COMPLICATIONS (HCC): ICD-10-CM

## 2019-07-17 DIAGNOSIS — R22.32 MASS OF LEFT UPPER EXTREMITY: ICD-10-CM

## 2019-07-17 DIAGNOSIS — R22.32 ARM MASS, LEFT: Primary | ICD-10-CM

## 2019-07-17 DIAGNOSIS — S52.124A CLOSED NONDISPLACED FRACTURE OF HEAD OF RIGHT RADIUS, INITIAL ENCOUNTER: ICD-10-CM

## 2019-07-17 DIAGNOSIS — R93.89 ABNORMAL ULTRASOUND: ICD-10-CM

## 2019-07-17 PROCEDURE — 77080 DXA BONE DENSITY AXIAL: CPT | Performed by: NURSE PRACTITIONER

## 2019-07-17 PROCEDURE — 76882 US LMTD JT/FCL EVL NVASC XTR: CPT | Performed by: NURSE PRACTITIONER

## 2019-10-23 ENCOUNTER — TELEPHONE (OUTPATIENT)
Dept: INTERNAL MEDICINE CLINIC | Facility: CLINIC | Age: 39
End: 2019-10-23

## 2019-10-23 NOTE — TELEPHONE ENCOUNTER
Please place orders for  3rd Hep B shot per patient    Future Appointments   Date Time Provider Tala Conley   10/25/2019  9:45 AM [facility] 35 NURSE [facility]

## 2019-10-25 ENCOUNTER — NURSE ONLY (OUTPATIENT)
Dept: INTERNAL MEDICINE CLINIC | Facility: CLINIC | Age: 39
End: 2019-10-25
Payer: COMMERCIAL

## 2019-10-25 VITALS — TEMPERATURE: 99 F

## 2019-10-25 DIAGNOSIS — Z23 FLU VACCINE NEED: Primary | ICD-10-CM

## 2019-10-25 PROCEDURE — 90471 IMMUNIZATION ADMIN: CPT | Performed by: INTERNAL MEDICINE

## 2019-10-25 PROCEDURE — 90472 IMMUNIZATION ADMIN EACH ADD: CPT | Performed by: INTERNAL MEDICINE

## 2019-10-25 PROCEDURE — 90746 HEPB VACCINE 3 DOSE ADULT IM: CPT | Performed by: INTERNAL MEDICINE

## 2019-10-25 PROCEDURE — 90686 IIV4 VACC NO PRSV 0.5 ML IM: CPT | Performed by: INTERNAL MEDICINE

## 2020-03-23 RX ORDER — CYANOCOBALAMIN 1000 UG/ML
INJECTION INTRAMUSCULAR; SUBCUTANEOUS
Qty: 4 ML | Refills: 0 | Status: SHIPPED | OUTPATIENT
Start: 2020-03-23 | End: 2020-09-04

## 2020-08-12 ENCOUNTER — TELEPHONE (OUTPATIENT)
Dept: INTERNAL MEDICINE CLINIC | Facility: CLINIC | Age: 40
End: 2020-08-12

## 2020-08-12 DIAGNOSIS — Z13.220 SCREENING FOR LIPID DISORDERS: ICD-10-CM

## 2020-08-12 DIAGNOSIS — Z00.00 ROUTINE GENERAL MEDICAL EXAMINATION AT A HEALTH CARE FACILITY: Primary | ICD-10-CM

## 2020-08-12 DIAGNOSIS — Z13.228 SCREENING FOR METABOLIC DISORDER: ICD-10-CM

## 2020-08-12 DIAGNOSIS — Z13.29 SCREENING FOR THYROID DISORDER: ICD-10-CM

## 2020-08-12 DIAGNOSIS — Z13.0 SCREENING FOR BLOOD DISEASE: ICD-10-CM

## 2020-08-12 NOTE — TELEPHONE ENCOUNTER
Future Appointments   Date Time Provider Tala Yael   10/21/2020  7:30 AM Alonso Dickerson MD [facility] 75[facility]     Annual Physical   Lab is THE [facility]  Pt aware to fast and to complete labs no sooner than 2 weeks prior to physical   No call back req

## 2020-09-02 ENCOUNTER — PATIENT MESSAGE (OUTPATIENT)
Dept: OBGYN CLINIC | Facility: CLINIC | Age: 40
End: 2020-09-02

## 2020-09-02 DIAGNOSIS — Z12.31 SCREENING MAMMOGRAM, ENCOUNTER FOR: Primary | ICD-10-CM

## 2020-09-02 NOTE — TELEPHONE ENCOUNTER
PASSED per protocol, refill sent.   Last PE No recent pe in last 2 years, PE scheduled for 10.21.20   Future Appointments   Date Time Provider Tala Yael   9/22/2020  3:15 PM Lyric Delarosa MD [facility] OB/GYN M [facility] Roxana Deng   10/21/2020  7:30 AM Schriedel,

## 2020-09-04 RX ORDER — CYANOCOBALAMIN 1000 UG/ML
INJECTION INTRAMUSCULAR; SUBCUTANEOUS
Qty: 4 ML | Refills: 0 | Status: SHIPPED | OUTPATIENT
Start: 2020-09-04 | End: 2021-04-06

## 2020-09-04 NOTE — TELEPHONE ENCOUNTER
Last OV 7.16.19 w/ SD (acute)   Last PE 6.30.18-PE scheduled for 10.21.20   Last REFILL 3.23.20 Cyanocobalamin 1000mcg/mL 4mL 0R  Last LABS No recent labs within last 12 months     Future Appointments   Date Time Provider Tala Conley   9/22/2020  3:1

## 2020-09-16 ENCOUNTER — NURSE ONLY (OUTPATIENT)
Dept: ALLERGY | Facility: CLINIC | Age: 40
End: 2020-09-16
Payer: COMMERCIAL

## 2020-09-16 ENCOUNTER — OFFICE VISIT (OUTPATIENT)
Dept: ALLERGY | Facility: CLINIC | Age: 40
End: 2020-09-16
Payer: COMMERCIAL

## 2020-09-16 VITALS
BODY MASS INDEX: 23.54 KG/M2 | TEMPERATURE: 99 F | DIASTOLIC BLOOD PRESSURE: 78 MMHG | WEIGHT: 150 LBS | SYSTOLIC BLOOD PRESSURE: 114 MMHG | OXYGEN SATURATION: 96 % | HEART RATE: 70 BPM | HEIGHT: 67 IN

## 2020-09-16 DIAGNOSIS — J30.2 SEASONAL ALLERGIC RHINITIS, UNSPECIFIED TRIGGER: Primary | ICD-10-CM

## 2020-09-16 DIAGNOSIS — Z91.09 ENVIRONMENTAL ALLERGIES: ICD-10-CM

## 2020-09-16 PROCEDURE — 3008F BODY MASS INDEX DOCD: CPT | Performed by: ALLERGY & IMMUNOLOGY

## 2020-09-16 PROCEDURE — 99204 OFFICE O/P NEW MOD 45 MIN: CPT | Performed by: ALLERGY & IMMUNOLOGY

## 2020-09-16 PROCEDURE — 3074F SYST BP LT 130 MM HG: CPT | Performed by: ALLERGY & IMMUNOLOGY

## 2020-09-16 PROCEDURE — 3078F DIAST BP <80 MM HG: CPT | Performed by: ALLERGY & IMMUNOLOGY

## 2020-09-16 PROCEDURE — 95024 IQ TESTS W/ALLERGENIC XTRCS: CPT | Performed by: ALLERGY & IMMUNOLOGY

## 2020-09-16 PROCEDURE — 95004 PERQ TESTS W/ALRGNC XTRCS: CPT | Performed by: ALLERGY & IMMUNOLOGY

## 2020-09-16 NOTE — PROGRESS NOTES
Steven Sotelo is a 38year old female. HPI:   Patient presents with:   Allergies: per patient for the past several weeks has had itchy watery eyes, nasal congetion which is clear, ithcy throat, last week has lip swelling, denies hives, no facial swellin DR. Aguayo Angry      Family History   Problem Relation Age of Onset   • Other (Other) Mother         autoimmune ITB   • Other (Idiopathic Thrombocytopenic Purpura) Mother    • Cancer Paternal Grandmother         Cervical   • Ovarian Cancer Paternal Cortez Moon hematuria  Hema/Lymph:  Negative for easy bleeding and easy bruising  Integumentary:  Negative for pruritus and rash  Musculoskeletal:  Negative for joint symptoms  Neurological:  Negative for dizziness, seizures  Psychiatric:  Negative for inappropriate i on allergic rhinitis versus nonallergic rhinitis provided and reviewed  Handouts on allergies and avoidance measures provided and reviewed  May consider trial of Flonase or Nasacort 2 sprays per nostril once a day.   Typically used on a daily basis to preve

## 2020-09-16 NOTE — PATIENT INSTRUCTIONS
Recs:   Handouts on allergic rhinitis versus nonallergic rhinitis provided and reviewed  Handouts on allergies and avoidance measures provided and reviewed  May consider trial of Flonase or Nasacort 2 sprays per nostril once a day.   Typically used on a analia

## 2020-09-17 ENCOUNTER — HOSPITAL ENCOUNTER (OUTPATIENT)
Dept: MAMMOGRAPHY | Age: 40
Discharge: HOME | End: 2020-09-17
Attending: OBSTETRICS & GYNECOLOGY
Payer: COMMERCIAL

## 2020-09-17 DIAGNOSIS — Z12.31 SCREENING MAMMOGRAM, ENCOUNTER FOR: ICD-10-CM

## 2020-09-17 PROCEDURE — 77067 SCR MAMMO BI INCL CAD: CPT | Performed by: OBSTETRICS & GYNECOLOGY

## 2020-09-17 PROCEDURE — 77063 BREAST TOMOSYNTHESIS BI: CPT | Performed by: OBSTETRICS & GYNECOLOGY

## 2020-09-22 ENCOUNTER — OFFICE VISIT (OUTPATIENT)
Dept: OBGYN CLINIC | Facility: CLINIC | Age: 40
End: 2020-09-22
Payer: COMMERCIAL

## 2020-09-22 VITALS
HEART RATE: 76 BPM | DIASTOLIC BLOOD PRESSURE: 62 MMHG | WEIGHT: 155 LBS | SYSTOLIC BLOOD PRESSURE: 98 MMHG | BODY MASS INDEX: 24.33 KG/M2 | HEIGHT: 67 IN

## 2020-09-22 DIAGNOSIS — Z01.419 WELL WOMAN EXAM WITH ROUTINE GYNECOLOGICAL EXAM: ICD-10-CM

## 2020-09-22 DIAGNOSIS — Z12.31 ENCOUNTER FOR SCREENING MAMMOGRAM FOR MALIGNANT NEOPLASM OF BREAST: Primary | ICD-10-CM

## 2020-09-22 PROCEDURE — 99396 PREV VISIT EST AGE 40-64: CPT | Performed by: OBSTETRICS & GYNECOLOGY

## 2020-09-22 PROCEDURE — 3078F DIAST BP <80 MM HG: CPT | Performed by: OBSTETRICS & GYNECOLOGY

## 2020-09-22 PROCEDURE — 3008F BODY MASS INDEX DOCD: CPT | Performed by: OBSTETRICS & GYNECOLOGY

## 2020-09-22 PROCEDURE — 3074F SYST BP LT 130 MM HG: CPT | Performed by: OBSTETRICS & GYNECOLOGY

## 2020-09-22 NOTE — PROGRESS NOTES
Sheri May is a 38year old female [id number] Patient's last menstrual period was 08/30/2020 (approximate). Patient presents with:  Wellness Visit  . She had gestational diabetes with her second pregnancy she is getting blood work done soon.   She has ha insecurity        Worry: Not on file        Inability: Not on file      Transportation needs        Medical: Not on file        Non-medical: Not on file    Tobacco Use      Smoking status: Never Smoker      Smokeless tobacco: Never Used    Substance and Se HER 70,S   • Stroke Maternal Grandfather    • Other (Cardiac Failure) Maternal Grandfather    • Diabetes Paternal Grandfather    • Other (Other) Paternal Grandfather    • Diabetes Maternal Grandfather        MEDICATIONS:    Current Outpatient Medications: lesions, no abnormal discharge  Cervix:  Normal without tenderness on motion without lesions.   Uterus: normal in size, contour, position, mobility, without tenderness  Adnexa: normal without masses or tenderness  Perineum: normal  Anus: no hemorroids     A

## 2020-10-21 ENCOUNTER — OFFICE VISIT (OUTPATIENT)
Dept: INTERNAL MEDICINE CLINIC | Facility: CLINIC | Age: 40
End: 2020-10-21
Payer: COMMERCIAL

## 2020-10-21 VITALS
HEART RATE: 60 BPM | RESPIRATION RATE: 18 BRPM | WEIGHT: 158.19 LBS | HEIGHT: 67 IN | SYSTOLIC BLOOD PRESSURE: 112 MMHG | BODY MASS INDEX: 24.83 KG/M2 | DIASTOLIC BLOOD PRESSURE: 76 MMHG | TEMPERATURE: 97 F

## 2020-10-21 DIAGNOSIS — Z13.29 SCREENING FOR THYROID DISORDER: ICD-10-CM

## 2020-10-21 DIAGNOSIS — Z13.220 SCREENING FOR LIPID DISORDERS: ICD-10-CM

## 2020-10-21 DIAGNOSIS — Z00.00 PE (PHYSICAL EXAM), ANNUAL: Primary | ICD-10-CM

## 2020-10-21 DIAGNOSIS — K51.20 CHRONIC ULCERATIVE PROCTITIS WITHOUT COMPLICATIONS (HCC): ICD-10-CM

## 2020-10-21 PROBLEM — R79.89 AZOTEMIA: Status: RESOLVED | Noted: 2019-01-02 | Resolved: 2020-10-21

## 2020-10-21 PROBLEM — K56.7 ILEUS OF UNSPECIFIED TYPE (HCC): Status: RESOLVED | Noted: 2019-01-02 | Resolved: 2020-10-21

## 2020-10-21 PROBLEM — S52.124A CLOSED NONDISPLACED FRACTURE OF HEAD OF RIGHT RADIUS, INITIAL ENCOUNTER: Status: RESOLVED | Noted: 2019-02-04 | Resolved: 2020-10-21

## 2020-10-21 PROBLEM — K51.919 EXACERBATION OF ULCERATIVE COLITIS WITH COMPLICATION (HCC): Status: RESOLVED | Noted: 2019-01-02 | Resolved: 2020-10-21

## 2020-10-21 PROBLEM — D72.829 LEUKOCYTOSIS: Status: RESOLVED | Noted: 2018-06-30 | Resolved: 2020-10-21

## 2020-10-21 PROCEDURE — 3078F DIAST BP <80 MM HG: CPT | Performed by: INTERNAL MEDICINE

## 2020-10-21 PROCEDURE — 3074F SYST BP LT 130 MM HG: CPT | Performed by: INTERNAL MEDICINE

## 2020-10-21 PROCEDURE — 99396 PREV VISIT EST AGE 40-64: CPT | Performed by: INTERNAL MEDICINE

## 2020-10-21 PROCEDURE — 3008F BODY MASS INDEX DOCD: CPT | Performed by: INTERNAL MEDICINE

## 2020-10-21 NOTE — PROGRESS NOTES
Patient presents with:  Wellness Visit: MR rm 8 annual pe       HPI:  Here for cpe. Stable UC on entyvio. Doing well. Taking meds no se's. Has mild callie, no active bleeding. On po iron.      Review of Systems   Constitutional: Negative for fever, chills an Leukocytosis 6/30/2018   • Low vitamin B12 level 2014    monthly b12 injections   • Pap smear for cervical cancer screening 07/24/2015    negative   • Plantar warts    • Ulcerative colitis ([facility] [address]    • Wears glasses      Past Surgical History:   Procedure La Allergies    Gluten                  OTHER (SEE COMMENTS)    Comment:Celiac disease    Health Maintenance  Immunizations:    Immunization History  Administered            Date(s) Administered    >=3 YRS TRI  MULTIDOSE VIAL (17813) FLU CLINIC adenopathy. Neurological: Normal reflexes. No cranial nerve deficit or sensory deficit. Normal muscle tone. Coordination normal.   Skin: Skin is warm and dry. No rash noted. No erythema. No pallor. Psychiatric: Normal mood and affect.      A/P:    Scree

## 2020-12-09 ENCOUNTER — PATIENT MESSAGE (OUTPATIENT)
Dept: INTERNAL MEDICINE CLINIC | Facility: CLINIC | Age: 40
End: 2020-12-09

## 2020-12-09 DIAGNOSIS — E78.5 DYSLIPIDEMIA: Primary | ICD-10-CM

## 2020-12-09 NOTE — TELEPHONE ENCOUNTER
From: Gennaro Cooks Oros  To: Ariane Moncada MD  Sent: 12/9/2020 8:14 AM CST  Subject: Test Results Question    Good morning! I just looked at my cholesterol test results and noticed they are little high. My last test was on 2/16/19 at [facility] [place].

## 2021-02-15 ENCOUNTER — TELEPHONE (OUTPATIENT)
Dept: SCHEDULING | Age: 41
End: 2021-02-15

## 2021-02-15 DIAGNOSIS — Z13.6 SCREENING FOR CARDIOVASCULAR CONDITION: Primary | ICD-10-CM

## 2021-03-11 ENCOUNTER — HOSPITAL ENCOUNTER (OUTPATIENT)
Dept: CT IMAGING | Age: 41
End: 2021-03-11
Attending: INTERNAL MEDICINE

## 2021-04-06 RX ORDER — CYANOCOBALAMIN 1000 UG/ML
INJECTION INTRAMUSCULAR; SUBCUTANEOUS
Qty: 4 ML | Refills: 0 | Status: SHIPPED | OUTPATIENT
Start: 2021-04-06 | End: 2021-11-17

## 2021-04-06 NOTE — TELEPHONE ENCOUNTER
Last VISIT 10/21/20    Last REFILL 09/04/20 qty 4 mL w/ refills    Last LABS 03/27/21 Lipid done    No future appointments. Per PROTOCOL? Not on protocol      Please Approve or Deny.

## 2021-05-12 ENCOUNTER — HOSPITAL ENCOUNTER (OUTPATIENT)
Age: 41
Discharge: HOME | End: 2021-05-12
Payer: COMMERCIAL

## 2021-05-12 VITALS
HEART RATE: 79 BPM | WEIGHT: 158.31 LBS | BODY MASS INDEX: 25 KG/M2 | TEMPERATURE: 99 F | RESPIRATION RATE: 18 BRPM | OXYGEN SATURATION: 99 % | DIASTOLIC BLOOD PRESSURE: 88 MMHG | SYSTOLIC BLOOD PRESSURE: 121 MMHG

## 2021-05-12 DIAGNOSIS — J01.00 ACUTE NON-RECURRENT MAXILLARY SINUSITIS: Primary | ICD-10-CM

## 2021-05-12 PROCEDURE — 99213 OFFICE O/P EST LOW 20 MIN: CPT | Performed by: NURSE PRACTITIONER

## 2021-05-12 RX ORDER — PREDNISONE 20 MG/1
40 TABLET ORAL DAILY
Qty: 10 TABLET | Refills: 0 | Status: SHIPPED | OUTPATIENT
Start: 2021-05-12 | End: 2021-05-17

## 2021-05-12 RX ORDER — DOXYCYCLINE HYCLATE 100 MG/1
100 CAPSULE ORAL 2 TIMES DAILY
Qty: 20 CAPSULE | Refills: 0 | Status: SHIPPED | OUTPATIENT
Start: 2021-05-12 | End: 2021-05-22

## 2021-05-12 NOTE — ED PROVIDER NOTES
Patient Seen in: Immediate [address] [place] [address]      History   Patient presents with:  Sinus Problem: Entered by patient    Stated Complaint: Sinus Problem    HPI/Subjective:   50-year-old female presents to immediate care with sinus pain and pressure, beer per week      Comment: cage 7/16/19    Drug use: No             Review of Systems   Constitutional: Negative. HENT: Positive for congestion, sinus pressure and sneezing. Respiratory: Positive for cough. Cardiovascular: Negative.     Dante Saleh antibiotics at this time and steroids. Patient agreeable to plan of care.                                  Disposition and Plan     Clinical Impression:  Acute non-recurrent maxillary sinusitis  (primary encounter diagnosis)     Disposition:  Discharge  5/

## 2021-05-12 NOTE — ED INITIAL ASSESSMENT (HPI)
Sinus pressure, congestion w post nasal drip x 3 days. Son + strep. Pt denies fever. Has been vaccinated for Covid.

## 2021-05-19 ENCOUNTER — LAB ENCOUNTER (OUTPATIENT)
Dept: LAB | Facility: HOSPITAL | Age: 41
End: 2021-05-19
Attending: INTERNAL MEDICINE
Payer: COMMERCIAL

## 2021-05-19 ENCOUNTER — HOSPITAL ENCOUNTER (OUTPATIENT)
Dept: CT IMAGING | Facility: HOSPITAL | Age: 41
Discharge: HOME | End: 2021-05-19
Attending: INTERNAL MEDICINE
Payer: COMMERCIAL

## 2021-05-19 ENCOUNTER — TELEPHONE (OUTPATIENT)
Dept: INTERNAL MEDICINE CLINIC | Facility: CLINIC | Age: 41
End: 2021-05-19

## 2021-05-19 ENCOUNTER — OFFICE VISIT (OUTPATIENT)
Dept: INTERNAL MEDICINE CLINIC | Facility: CLINIC | Age: 41
End: 2021-05-19
Payer: COMMERCIAL

## 2021-05-19 VITALS
SYSTOLIC BLOOD PRESSURE: 112 MMHG | RESPIRATION RATE: 18 BRPM | HEIGHT: 67 IN | DIASTOLIC BLOOD PRESSURE: 82 MMHG | WEIGHT: 161.81 LBS | TEMPERATURE: 98 F | BODY MASS INDEX: 25.4 KG/M2

## 2021-05-19 DIAGNOSIS — R42 DISEQUILIBRIUM: ICD-10-CM

## 2021-05-19 DIAGNOSIS — J01.90 ACUTE SINUSITIS, RECURRENCE NOT SPECIFIED, UNSPECIFIED LOCATION: ICD-10-CM

## 2021-05-19 DIAGNOSIS — R42 DISEQUILIBRIUM: Primary | ICD-10-CM

## 2021-05-19 PROCEDURE — 36415 COLL VENOUS BLD VENIPUNCTURE: CPT

## 2021-05-19 PROCEDURE — 70450 CT HEAD/BRAIN W/O DYE: CPT | Performed by: INTERNAL MEDICINE

## 2021-05-19 PROCEDURE — 85025 COMPLETE CBC W/AUTO DIFF WBC: CPT

## 2021-05-19 PROCEDURE — 82607 VITAMIN B-12: CPT

## 2021-05-19 PROCEDURE — 80053 COMPREHEN METABOLIC PANEL: CPT

## 2021-05-19 PROCEDURE — 84443 ASSAY THYROID STIM HORMONE: CPT

## 2021-05-19 PROCEDURE — 99213 OFFICE O/P EST LOW 20 MIN: CPT | Performed by: INTERNAL MEDICINE

## 2021-05-19 PROCEDURE — 3008F BODY MASS INDEX DOCD: CPT | Performed by: INTERNAL MEDICINE

## 2021-05-19 PROCEDURE — 3079F DIAST BP 80-89 MM HG: CPT | Performed by: INTERNAL MEDICINE

## 2021-05-19 PROCEDURE — 84439 ASSAY OF FREE THYROXINE: CPT

## 2021-05-19 PROCEDURE — 3074F SYST BP LT 130 MM HG: CPT | Performed by: INTERNAL MEDICINE

## 2021-05-20 ENCOUNTER — TELEPHONE (OUTPATIENT)
Dept: INTERNAL MEDICINE CLINIC | Facility: CLINIC | Age: 41
End: 2021-05-20

## 2021-05-20 NOTE — PROGRESS NOTES
Patient presents with:  Dizziness: MR rm 8 pt states she feel off balance and having dizzy spells x1wk      HPI:here for unsteady gait. Pt notes that she has felt \"off\" for about 10 days, started after viruu/runny nose, headache, cough, sneezing.  Went to capsule 0   • CYANOCOBALAMIN 1000 MCG/ML Injection Solution INJECT 1 ML INTO THE MUSCLE EVERY 30 DAYS 4 mL 0   • Syringe/Needle, Disp, (BD INTEGRA SYRINGE) 25G X 1\" 3 ML Does not apply Misc USE AS DIRECTED 12 each 0   • Vedolizumab (ENTYVIO IV) Inject int Orders Placed This Encounter      CBC W Differential W Platelet [E]      Comp Metabolic Panel (14)      TSH and Free T4      VITAMIN B12      Meds & Refills for this Visit:  Requested Prescriptions      No prescriptions requested or ordered in this encou

## 2021-06-04 ENCOUNTER — MED REC SCAN ONLY (OUTPATIENT)
Dept: INTERNAL MEDICINE CLINIC | Facility: CLINIC | Age: 41
End: 2021-06-04

## 2021-10-13 ENCOUNTER — PATIENT MESSAGE (OUTPATIENT)
Dept: INTERNAL MEDICINE CLINIC | Facility: CLINIC | Age: 41
End: 2021-10-13

## 2021-10-15 NOTE — TELEPHONE ENCOUNTER
From: Nancy Falk  To: Yolanda Sauceda MD  Sent: 10/13/2021 3:21 PM CDT  Subject: Blood work    Hi,    I received a reminder for bloodwork in my chart. I just want to make sure it’s accurate.     Thanks,  Lilo Banegas

## 2021-11-17 RX ORDER — CYANOCOBALAMIN 1000 UG/ML
INJECTION INTRAMUSCULAR; SUBCUTANEOUS
Qty: 4 ML | Refills: 0 | Status: SHIPPED | OUTPATIENT
Start: 2021-11-17

## 2021-11-17 NOTE — TELEPHONE ENCOUNTER
Last OV 5.19.21 w/ AS (dizziness)   Last PE 10.21.20-overdue   Last REFILL 4.6.21 Cyanocobalamin 1000mcg/mL #4mL 0R  Last LABS 5.19.21 VitB12, TSH+FreeT4, CMP, CBC    No future appointments. Per PROTOCOL?  Not on protocol     Please Advise

## 2022-01-18 ENCOUNTER — OFFICE VISIT (OUTPATIENT)
Dept: NEUROLOGY | Facility: CLINIC | Age: 42
End: 2022-01-18
Payer: COMMERCIAL

## 2022-01-18 VITALS
DIASTOLIC BLOOD PRESSURE: 64 MMHG | SYSTOLIC BLOOD PRESSURE: 104 MMHG | BODY MASS INDEX: 26 KG/M2 | WEIGHT: 163 LBS | RESPIRATION RATE: 16 BRPM | HEART RATE: 74 BPM

## 2022-01-18 DIAGNOSIS — R42 VERTIGO: Primary | ICD-10-CM

## 2022-01-18 PROCEDURE — 99204 OFFICE O/P NEW MOD 45 MIN: CPT | Performed by: OTHER

## 2022-01-18 PROCEDURE — 3078F DIAST BP <80 MM HG: CPT | Performed by: OTHER

## 2022-01-18 PROCEDURE — 3074F SYST BP LT 130 MM HG: CPT | Performed by: OTHER

## 2022-01-18 RX ORDER — ERGOCALCIFEROL 1.25 MG/1
50000 CAPSULE ORAL
COMMUNITY
Start: 2021-11-30

## 2022-01-18 RX ORDER — MECLIZINE HYDROCHLORIDE CHEWABLE TABLETS 25 MG/1
25 TABLET, CHEWABLE ORAL 3 TIMES DAILY PRN
Qty: 60 TABLET | Refills: 0 | Status: SHIPPED | OUTPATIENT
Start: 2022-01-18 | End: 2022-02-17

## 2022-01-18 NOTE — PROGRESS NOTES
Patient reports she first noticed dizziness in May while driving. Dizziness is intermittent. Patient reports that \"things go sideways\". Patient notes she feels \"off\" when she looks over her shoulder while driving.     Patient reports these episod

## 2022-01-18 NOTE — PATIENT INSTRUCTIONS
After your visit at the [facility] office today,  please direct any follow up questions or medication needs to the staff in our [facility] office so that your concerns may be promptly addressed.   We are available through [employer] or at the numbers below: picked up in office. • Please allow the office 2-3 business days to fill the prescription. • Patient must present photo ID at time of . PLEASE NOTE: PRESCRIPTIONS MUST BE PICKED UP PRIOR TO 3:00PM MONDAY-FRIDAY    Scheduling Tests:     If your ph submitting forms to office staff. • Form completion may require an additional fee. • A signed Release of Information (DENNIS) must be on file before forms may be submitted. When dropping off forms, please ask the  for this paper.    • Failure

## 2022-01-18 NOTE — H&P
Neurology H&P    Aakash Johnsr Deya Patient Status:  No patient class for patient encounter    1980 MRN [id number]   Location [address]  Attending No att. providers found   Hosp Day # 0 PCP Maritza Ann MD Problem List:     Celiac disease     Left sided colitis with rectal bleeding (HCC)     Regional enteritis of unspecified site     Low vitamin B12 level     Contraceptive surveillance     [facility]     Iron deficiency anemia     Ulcerative (ch Grandmother         HER 70,S   • Stroke Maternal Grandfather    • Other (Cardiac Failure) Maternal Grandfather    • Diabetes Paternal Grandfather    • Other (Other) Paternal Grandfather    • Diabetes Maternal Grandfather             ROS:  10 point ROS comp (head to the L and gaze to the R) which reproduced her symptoms and caused nystagmus. I will start meclizine 25mg PO TID PRN. [address] reviewed. PT referral placed for vestibular therapy      Plan:  1.  Vertigo  - BPPV  - + Mitlai-Halpike with head to the L and eye

## 2022-02-15 ENCOUNTER — TELEPHONE (OUTPATIENT)
Dept: PHYSICAL THERAPY | Facility: HOSPITAL | Age: 42
End: 2022-02-15

## 2022-02-17 ENCOUNTER — APPOINTMENT (OUTPATIENT)
Dept: PHYSICAL THERAPY | Facility: HOSPITAL | Age: 42
End: 2022-02-17
Attending: Other
Payer: COMMERCIAL

## 2022-02-22 ENCOUNTER — OFFICE VISIT (OUTPATIENT)
Dept: PHYSICAL THERAPY | Facility: HOSPITAL | Age: 42
End: 2022-02-22
Attending: Other
Payer: COMMERCIAL

## 2022-02-22 DIAGNOSIS — R42 VERTIGO: ICD-10-CM

## 2022-02-22 PROCEDURE — 97161 PT EVAL LOW COMPLEX 20 MIN: CPT

## 2022-02-22 PROCEDURE — 97112 NEUROMUSCULAR REEDUCATION: CPT

## 2022-02-22 NOTE — PROGRESS NOTES
VESTIBULAR EVALUATION:   Referring Physician: Dr. Long Blank  Diagnosis:  Vertigo (R42)   Date of Service: 2/22/2022     PATIENT SUMMARY   Aleksey Falk is a 42year old female who presents to therapy today with reports of bouts of 30 sec dizziness that occur randomly 1x/week  History/onset of current condition:   She states that last summer she was driving her [place] and had a wave a dizziness. She feels like the room shifts and gets off balance. She feels like she cannot balance, lasts 30 sec to a minute, and happens once a week. She states nothing precipitates this, it happens and she recovers. CT scan summer 2021: chronic sinusitis. Symptoms with cough/sneeze or loud noise: No  Falls: No  Hx of migraines: No  Hx of vision issue: No  Hx of hearing issues: none    Dizziness: Current 0/10, Best 0/10, Worst 10/10  Quality: dizzy, scares her, feels off balance, can feel off  Frequency/Duration:  30-60\" seconds, once a week  Aggravates: Unsure  Relieves: Unsure, sit and wait for it to pass    Headache: NA    Cervical pain: NA     Dizziness Handicap Inventory [facility]): NT    Current functional limitations include: everything off, worried to drive, can happen at work. Social history: teacher  Hernan Bland describes prior level of function: no issues with dizziness. Pt goals include: get rid of dizziness. Past medical history was reviewed with Hernan Bland.  Significant findings include:  has a past medical history of Abdominal pain, Azotemia (1/2/2019), Celiac disease, Closed nondisplaced fracture of head of right radius, subsequent encounter (2/4/2019), Colitis, Contact dermatitis and other eczema, due to unspecified cause (6/29/2012), Exacerbation of ulcerative colitis with complication ([facility] [address] (7/2/1453), Hemorrhage of gastrointestinal tract (6/12/2006), Hemorrhage of rectum and anus, Ileus of unspecified type ([facility] [address] (1/2/2019), Leukocytosis (6/30/2018), Low vitamin B12 level (2014), Pap smear for cervical cancer screening (07/24/2015), Plantar warts, Ulcerative colitis ([facility] [address] and Wears glasses. She has no past medical history of Anesthesia complication, Asthma, Difficult intubation, Esophageal reflux, Malignant hyperthermia, Other specified diseases of blood and blood-forming organs(289.89), or PONV (postoperative nausea and vomiting). ASSESSMENT  Jeanette Platt presents to physical therapy evaluation with primary c/o episodes of dizziness. The results of the objective tests and measures show clear oculomotor exam, some dizziness c VOR, positive DVA for 4 line loss, good static balance (even with EC), inconclusive positional testing (treated L posterior canal as she felt a bit dizzy here). Functional deficits include but are not limited to driving, working as a teacher, all ADLs. Signs and symptoms are consistent with diagnosis of vertigo vs vestibular hypofunction. The randomness of the dizziness and the description sound more like a hypofunction, but the length of dizziness seems more vertiginous. Pt and PT discussed evaluation findings, pathology, POC and HEP. Pt voiced understanding and performs HEP correctly. Skilled Physical Therapy is medically necessary to address the above impairments and reach functional goals. Precautions:  None  OBJECTIVE:   Physical Exam:  Posture/Observation: WFL   Neuro Screen: Sensation: Light touch in tact    Coordination Testing:   Finger to Nose: WNL   Pronation/supination: WNL   Toe tapping: WNL     Special tests:   Mod VBI in sitting: negative  Alar ligament: negative  Sharp pham: negative    Cervical spine ROM:all WFL  Adverse neuro signs with ROM: yes no: no     Occulomotor & Vestibulo-Ocular Exam:  Spontaneous Nystagmus: room light: negative;  fixation blocked: NT  Smooth Pursuit: Negative  Saccades:end rage saccades  Gaze Evoked Nystagmus:  room light:negative  Head Thrust: negative  VOR screen:   Feels a bit off  VOR Cancellation: negative  Convergence: Negative  Cover/Uncover:  Negative  Cross Cover:  Negative  Head Shaking Nystagmus: negative  Dynamic Visual Acuity:  Positive for 4 line loss    Positional Testing:   Mitali-Hallpike: R neg x2, L mild dizziness  Roll Test [facility]): neg x2     Postural Control:   Romberg: EO 30 sec, EC 30 sec   Romberg on Foam: EO 30 sec, EC30 sec   Tandem Stance: R back EO 30 sec, EC 30 sec; L back EO 30 sec, EC 30 sec   SLS: R EO 20 sec, EC 20 sec; L EO 18 sec, EC 15 sec     Functional Mobility:   Gait: pt ambulates on level ground with normal mechanics. Today's Treatment and Response:   Pt education was provided on exam findings, treatment diagnosis, treatment plan, expectations, and prognosis. Pt was also provided recommendations for activity modifications, importance of remaining active and possible dizziness after evaluation. Patient was instructed in and issued a HEP for: 2x1 min gaze shifts both directions x12 min a  day, grounding in sitting, discussed VORx1  CRT: L epley performed  Charges: PT Eval Low Complexity, neuro re-ed:1      Total Timed Treatment: 10 min     Total Treatment Time: 45min       PLAN OF CARE:    Goals: (to be met in 8 visits)  1. Pt will report no symptoms of dizziness for 5 consecutive days in order to return to ADLs  2. Pt will deny dizziness with positional changes to promote restful sleep at night  3. Pt will improve SLS to > 30 seconds in order to be safe while ambulating on uneven surfaces such as gravel and grass  4. Pt will be independent and compliant with comprehensive HEP to maintain progress achieved in PT      Frequency / Duration: Patient will be seen for 1-2x/week or a total of 8 visits over a 30 day period. Treatment will include: home exercise program development and instruction, patient/family education, balance training, canalith repositioning maneuver and eye/head coordination exercises.      Education or treatment limitation: None   Rehab Potential: good Patient/Family/Caregiver was advised of these findings, precautions, and treatment options and has agreed to actively participate in planning and for this course of care. Thank you for your referral. Please co-sign or sign and return this letter via fax as soon as possible to [phone]. If you have any questions, please contact me at Dept: [phone]    Sincerely,  Electronically signed by therapist: Boogie De La Cruz PT, DPT  [de-identified] certification required: Yes  I certify the need for these services furnished under this plan of treatment and while under my care.     X___________________________________________________ Date____________________    Certification From: 5/62/1014  To:5/23/2022

## 2022-02-24 ENCOUNTER — APPOINTMENT (OUTPATIENT)
Dept: PHYSICAL THERAPY | Facility: HOSPITAL | Age: 42
End: 2022-02-24
Attending: Other
Payer: COMMERCIAL

## 2022-03-01 ENCOUNTER — TELEPHONE (OUTPATIENT)
Dept: INTERNAL MEDICINE CLINIC | Facility: CLINIC | Age: 42
End: 2022-03-01

## 2022-03-01 ENCOUNTER — OFFICE VISIT (OUTPATIENT)
Dept: PHYSICAL THERAPY | Facility: HOSPITAL | Age: 42
End: 2022-03-01
Attending: Other
Payer: COMMERCIAL

## 2022-03-01 ENCOUNTER — TELEPHONE (OUTPATIENT)
Dept: NEUROLOGY | Facility: CLINIC | Age: 42
End: 2022-03-01

## 2022-03-01 ENCOUNTER — TELEPHONE (OUTPATIENT)
Dept: PHYSICAL THERAPY | Facility: HOSPITAL | Age: 42
End: 2022-03-01

## 2022-03-01 DIAGNOSIS — R42 VERTIGO: ICD-10-CM

## 2022-03-01 PROCEDURE — 97112 NEUROMUSCULAR REEDUCATION: CPT

## 2022-03-01 NOTE — TELEPHONE ENCOUNTER
LM for PT to discuss s/s of dizziness/vertigo:   Kae Moralez PT with [facility] (Other) [phone]       LOV 5/19/21 with AS. Dr Belem Mack ordered PT on 1/18/22 at [facility] on that day.

## 2022-03-01 NOTE — TELEPHONE ENCOUNTER
Spoke with patient and relayed message from Dr. Sharman Brittle. Discussed that patient should not have tests done until she hears from our PA team that tests have been authorized by insurance. She was thankful for the call, as she already has the tests scheduled for 3/7/22. Advised her that if she hasn't heard from us by Friday to call for status update. Pt verbalized understanding, agrees to plan, and expresses intent to comply with recommendations given. All questions answered.

## 2022-03-01 NOTE — TELEPHONE ENCOUNTER
Seeing her for dizziness/vertigo this is the second time seeing her she doesn't think its \"vertigo\" she wants to further discuss possible \"arterial structures of the neck/movement/pressure\" will AS order more tests to rule out any other condition that is causing the \"dizziness/vertigo\"

## 2022-03-01 NOTE — TELEPHONE ENCOUNTER
Spoke to [facility] [address] [facility] PT, who will reach out to Dr Sung Franco Neurologist since he ordered the PT, recently saw her on 1/18/22. If pt want to be seen in our office to have her schedule with AS or partner to discuss symptoms. Jaime Mak understanding. FYI to AS.

## 2022-03-01 NOTE — TELEPHONE ENCOUNTER
Can we call this patient and tell hr that after speaking to PT I feel that we should get some imaging of her neck and carotids please.  I did call and left a VM on her machine bout this

## 2022-03-04 ENCOUNTER — TELEPHONE (OUTPATIENT)
Dept: SURGERY | Facility: CLINIC | Age: 42
End: 2022-03-04

## 2022-03-04 NOTE — TELEPHONE ENCOUNTER
Ct Cervical (77312) denial reason:    Denial reason for a diagnosis of dizziness this test should only be used when there is no improvement after a recent six weeks of conservative care. Treatment should include medication, home exercised of physical therapy. The patient records do not show conservative care. A Peer to Peer can be done by calling [phone] under BTD#[id number], ID# [id number]. Patient is scheduled for 3/7/2022      CTA Carotid ([id number]) has been approved. Please advise.

## 2022-03-04 NOTE — TELEPHONE ENCOUNTER
Pt scheduled for 8 am 3/7. Provider has already left the office so peer to peer will not be able to be completed prior to testing. Called pt and advised she cancel the CT cervical spine until further instruction given from provider. Verbalized understanding. Routing denial to provider for advisement.

## 2022-03-07 ENCOUNTER — HOSPITAL ENCOUNTER (OUTPATIENT)
Dept: CT IMAGING | Age: 42
Discharge: HOME | End: 2022-03-07
Attending: Other
Payer: COMMERCIAL

## 2022-03-07 ENCOUNTER — HOSPITAL ENCOUNTER (OUTPATIENT)
Dept: CT IMAGING | Age: 42
End: 2022-03-07
Attending: Other
Payer: COMMERCIAL

## 2022-03-07 DIAGNOSIS — R42 DIZZINESS: ICD-10-CM

## 2022-03-07 LAB — CREAT BLD-MCNC: 0.6 MG/DL

## 2022-03-07 PROCEDURE — 82565 ASSAY OF CREATININE: CPT

## 2022-03-07 PROCEDURE — 70498 CT ANGIOGRAPHY NECK: CPT | Performed by: OTHER

## 2022-03-07 RX ORDER — IOHEXOL 350 MG/ML
60 INJECTION, SOLUTION INTRAVENOUS
Status: COMPLETED | OUTPATIENT
Start: 2022-03-07 | End: 2022-03-07

## 2022-03-07 RX ADMIN — IOHEXOL 60 ML: 350 INJECTION, SOLUTION INTRAVENOUS at 08:45:00

## 2022-03-08 ENCOUNTER — OFFICE VISIT (OUTPATIENT)
Dept: PHYSICAL THERAPY | Facility: HOSPITAL | Age: 42
End: 2022-03-08
Attending: Other
Payer: COMMERCIAL

## 2022-03-08 DIAGNOSIS — R42 VERTIGO: ICD-10-CM

## 2022-03-08 PROCEDURE — 95992 CANALITH REPOSITIONING PROC: CPT

## 2022-03-08 PROCEDURE — 97112 NEUROMUSCULAR REEDUCATION: CPT

## 2022-03-09 NOTE — TELEPHONE ENCOUNTER
Per discussion with provider, he would like to complete the Peer to Peer if still available option as this imaging is needed. RN to call [facility] to determine if P2P is still available. Peer to peer available per [facility].

## 2022-03-15 ENCOUNTER — OFFICE VISIT (OUTPATIENT)
Dept: PHYSICAL THERAPY | Facility: HOSPITAL | Age: 42
End: 2022-03-15
Attending: Other
Payer: COMMERCIAL

## 2022-03-15 DIAGNOSIS — R42 VERTIGO: ICD-10-CM

## 2022-03-15 PROCEDURE — 97112 NEUROMUSCULAR REEDUCATION: CPT

## 2022-03-15 PROCEDURE — 95992 CANALITH REPOSITIONING PROC: CPT

## 2022-03-15 PROCEDURE — 97110 THERAPEUTIC EXERCISES: CPT

## 2022-03-22 ENCOUNTER — APPOINTMENT (OUTPATIENT)
Dept: PHYSICAL THERAPY | Facility: HOSPITAL | Age: 42
End: 2022-03-22
Attending: Other
Payer: COMMERCIAL

## 2022-03-29 ENCOUNTER — OFFICE VISIT (OUTPATIENT)
Dept: PHYSICAL THERAPY | Facility: HOSPITAL | Age: 42
End: 2022-03-29
Attending: Other
Payer: COMMERCIAL

## 2022-03-29 DIAGNOSIS — R42 VERTIGO: ICD-10-CM

## 2022-03-29 PROCEDURE — 97112 NEUROMUSCULAR REEDUCATION: CPT

## 2022-03-30 NOTE — TELEPHONE ENCOUNTER
RN called [facility] to discuss denial form CT cervical. Spoke with Jose Romero, nurse reviewer. Call transferred to Dr. Martin Luna for peer to peer.     LOR#[id number], ID# [id number]

## 2022-03-30 NOTE — TELEPHONE ENCOUNTER
LMTCB. Per discussion with Dr. Leonora Estrada, PT indicated that patient had reproducible vertigo when head was turned to the left so PT thought stenosis could be cause. Provider ordered CT, but was denied by insurance. Per peer to peer, patient's insurance will cover a MRI of cervical spine.

## 2022-03-30 NOTE — TELEPHONE ENCOUNTER
I spoke to Dr. Alexandra Mcmahon regarding CT of the C spne, She upheld the denial of the CT C spine but did states that she would approve and MRI C spine.  MRI C spine order placed

## 2022-03-31 NOTE — TELEPHONE ENCOUNTER
Relayed below to pt. Verbalized understanding     She needs to get her schedule together and then will call Central Scheduling.

## 2022-04-05 ENCOUNTER — APPOINTMENT (OUTPATIENT)
Dept: PHYSICAL THERAPY | Facility: HOSPITAL | Age: 42
End: 2022-04-05
Attending: Other
Payer: COMMERCIAL

## 2022-04-12 ENCOUNTER — HOSPITAL ENCOUNTER (OUTPATIENT)
Dept: MRI IMAGING | Age: 42
Discharge: HOME | End: 2022-04-12
Attending: Other
Payer: COMMERCIAL

## 2022-04-12 ENCOUNTER — APPOINTMENT (OUTPATIENT)
Dept: PHYSICAL THERAPY | Facility: HOSPITAL | Age: 42
End: 2022-04-12
Attending: Other
Payer: COMMERCIAL

## 2022-04-12 DIAGNOSIS — R26.9 GAIT DISTURBANCE: ICD-10-CM

## 2022-04-12 DIAGNOSIS — R42 VERTIGO: ICD-10-CM

## 2022-04-12 PROCEDURE — A9575 INJ GADOTERATE MEGLUMI 0.1ML: HCPCS | Performed by: OTHER

## 2022-04-12 PROCEDURE — 72156 MRI NECK SPINE W/O & W/DYE: CPT | Performed by: OTHER

## 2022-05-03 ENCOUNTER — OFFICE VISIT (OUTPATIENT)
Dept: FAMILY MEDICINE CLINIC | Facility: CLINIC | Age: 42
End: 2022-05-03
Payer: COMMERCIAL

## 2022-05-03 VITALS
DIASTOLIC BLOOD PRESSURE: 81 MMHG | RESPIRATION RATE: 18 BRPM | HEIGHT: 67 IN | WEIGHT: 155 LBS | SYSTOLIC BLOOD PRESSURE: 118 MMHG | BODY MASS INDEX: 24.33 KG/M2 | TEMPERATURE: 98 F | OXYGEN SATURATION: 98 % | HEART RATE: 96 BPM

## 2022-05-03 DIAGNOSIS — J06.9 VIRAL UPPER RESPIRATORY ILLNESS: Primary | ICD-10-CM

## 2022-05-03 PROCEDURE — 99213 OFFICE O/P EST LOW 20 MIN: CPT | Performed by: NURSE PRACTITIONER

## 2022-05-03 PROCEDURE — 3074F SYST BP LT 130 MM HG: CPT | Performed by: NURSE PRACTITIONER

## 2022-05-03 PROCEDURE — 3079F DIAST BP 80-89 MM HG: CPT | Performed by: NURSE PRACTITIONER

## 2022-05-03 PROCEDURE — 3008F BODY MASS INDEX DOCD: CPT | Performed by: NURSE PRACTITIONER

## 2022-05-04 LAB — SARS-COV-2 RNA RESP QL NAA+PROBE: NOT DETECTED

## 2022-07-12 NOTE — TELEPHONE ENCOUNTER
Calcium 500 mg three times a day  Continue vitamin D 5000 international unit(s) three times a week  Effexor  mg (instead of 262.5 mg) and reschedule with Francia in psychiatry pharmacy     From: Marianela Falk  To: Dale Daly MD  Sent: 9/2/2020 9:00 AM CDT  Subject: Referral Request    Hello,    I am due for another mammogram. I was hoping I could have an order.      Thanks,  Nadya Falk

## 2022-08-04 ENCOUNTER — HOSPITAL ENCOUNTER (OUTPATIENT)
Age: 42
Discharge: LEFT BEFORE DISCHARGE | End: 2022-08-04
Payer: COMMERCIAL

## 2022-09-16 ENCOUNTER — TELEPHONE (OUTPATIENT)
Dept: OBGYN CLINIC | Facility: CLINIC | Age: 42
End: 2022-09-16

## 2022-09-16 NOTE — TELEPHONE ENCOUNTER
C/o new onset of increase vaginal bleeding, pt is wearing a liner right now. She had spotting yest.     Advise increase hydration, continue MV. If pt soaking regular pad front to back, changing It q1h experience any SOB, dizziness to go to ER. Keep appt in office 9/20 for further evaluation, pt verb understanding.

## 2022-09-16 NOTE — TELEPHONE ENCOUNTER
Pt scheduled an appointment earlier for 9/20 but is calling back asking to speak to a nurse about her symptoms. Pt states she has not had a period in a couple years and not she is bleeding pretty heavy. Please advise.

## 2022-09-20 ENCOUNTER — OFFICE VISIT (OUTPATIENT)
Dept: OBGYN CLINIC | Facility: CLINIC | Age: 42
End: 2022-09-20

## 2022-09-20 VITALS
HEIGHT: 67 IN | BODY MASS INDEX: 25.3 KG/M2 | DIASTOLIC BLOOD PRESSURE: 66 MMHG | HEART RATE: 73 BPM | SYSTOLIC BLOOD PRESSURE: 112 MMHG | WEIGHT: 161.19 LBS

## 2022-09-20 DIAGNOSIS — N92.6 IRREGULAR BLEEDING: Primary | ICD-10-CM

## 2022-09-20 PROCEDURE — 3074F SYST BP LT 130 MM HG: CPT | Performed by: OBSTETRICS & GYNECOLOGY

## 2022-09-20 PROCEDURE — 99396 PREV VISIT EST AGE 40-64: CPT | Performed by: OBSTETRICS & GYNECOLOGY

## 2022-09-20 PROCEDURE — 3008F BODY MASS INDEX DOCD: CPT | Performed by: OBSTETRICS & GYNECOLOGY

## 2022-09-20 PROCEDURE — 3078F DIAST BP <80 MM HG: CPT | Performed by: OBSTETRICS & GYNECOLOGY

## 2022-09-22 LAB — TSH W/REFLEX TO FT4: 0.78 MIU/L

## 2022-09-29 ENCOUNTER — ULTRASOUND ENCOUNTER (OUTPATIENT)
Dept: OBGYN CLINIC | Facility: CLINIC | Age: 42
End: 2022-09-29
Payer: COMMERCIAL

## 2022-10-07 DIAGNOSIS — N83.201 RIGHT OVARIAN CYST: Primary | ICD-10-CM

## 2022-10-08 NOTE — TELEPHONE ENCOUNTER
Reymundo Gilliam from [facility] called with CT Brain results. Pt was not waiting.
15

## 2022-10-27 ENCOUNTER — IMMUNIZATION (OUTPATIENT)
Dept: LAB | Age: 42
End: 2022-10-27
Attending: EMERGENCY MEDICINE
Payer: COMMERCIAL

## 2022-10-27 DIAGNOSIS — Z23 NEED FOR VACCINATION: Primary | ICD-10-CM

## 2022-10-27 PROCEDURE — 0124A SARSCOV2 VAC BVL 30MCG/0.3ML: CPT

## 2022-11-18 ENCOUNTER — ULTRASOUND ENCOUNTER (OUTPATIENT)
Facility: CLINIC | Age: 42
End: 2022-11-18
Payer: COMMERCIAL

## 2022-11-30 ENCOUNTER — LAB ENCOUNTER (OUTPATIENT)
Dept: LAB | Age: 42
End: 2022-11-30
Attending: NURSE PRACTITIONER
Payer: COMMERCIAL

## 2022-11-30 ENCOUNTER — HOSPITAL ENCOUNTER (OUTPATIENT)
Dept: GENERAL RADIOLOGY | Age: 42
Discharge: HOME | End: 2022-11-30
Attending: NURSE PRACTITIONER
Payer: COMMERCIAL

## 2022-11-30 ENCOUNTER — OFFICE VISIT (OUTPATIENT)
Dept: INTERNAL MEDICINE CLINIC | Facility: CLINIC | Age: 42
End: 2022-11-30
Payer: COMMERCIAL

## 2022-11-30 VITALS
DIASTOLIC BLOOD PRESSURE: 62 MMHG | HEART RATE: 76 BPM | TEMPERATURE: 98 F | OXYGEN SATURATION: 98 % | HEIGHT: 67 IN | BODY MASS INDEX: 25.74 KG/M2 | WEIGHT: 164 LBS | SYSTOLIC BLOOD PRESSURE: 98 MMHG

## 2022-11-30 DIAGNOSIS — D64.9 ANEMIA, UNSPECIFIED TYPE: ICD-10-CM

## 2022-11-30 DIAGNOSIS — R61 NIGHT SWEATS: Primary | ICD-10-CM

## 2022-11-30 DIAGNOSIS — R61 NIGHT SWEATS: ICD-10-CM

## 2022-11-30 DIAGNOSIS — D50.8 OTHER IRON DEFICIENCY ANEMIA: ICD-10-CM

## 2022-11-30 LAB
ALBUMIN SERPL-MCNC: 4.1 G/DL (ref 3.4–5)
ALBUMIN/GLOB SERPL: 1.2 {RATIO} (ref 1–2)
ALP LIVER SERPL-CCNC: 70 U/L
ALT SERPL-CCNC: 44 U/L
ANION GAP SERPL CALC-SCNC: 4 MMOL/L (ref 0–18)
AST SERPL-CCNC: 21 U/L (ref 15–37)
BASOPHILS # BLD AUTO: 0.04 X10(3) UL (ref 0–0.2)
BASOPHILS NFR BLD AUTO: 0.8 %
BILIRUB SERPL-MCNC: 0.6 MG/DL (ref 0.1–2)
BUN BLD-MCNC: 22 MG/DL (ref 7–18)
BUN/CREAT SERPL: 37.3 (ref 10–20)
CALCIUM BLD-MCNC: 9.3 MG/DL (ref 8.5–10.1)
CHLORIDE SERPL-SCNC: 102 MMOL/L (ref 98–112)
CO2 SERPL-SCNC: 30 MMOL/L (ref 21–32)
CREAT BLD-MCNC: 0.59 MG/DL
DEPRECATED HBV CORE AB SER IA-ACNC: 153.5 NG/ML
DEPRECATED RDW RBC AUTO: 49.7 FL (ref 35.1–46.3)
EOSINOPHIL # BLD AUTO: 0.23 X10(3) UL (ref 0–0.7)
EOSINOPHIL NFR BLD AUTO: 4.6 %
ERYTHROCYTE [DISTWIDTH] IN BLOOD BY AUTOMATED COUNT: 15.2 % (ref 11–15)
FASTING STATUS PATIENT QL REPORTED: NO
GFR SERPLBLD BASED ON 1.73 SQ M-ARVRAT: 115 ML/MIN/1.73M2 (ref 60–?)
GLOBULIN PLAS-MCNC: 3.5 G/DL (ref 2.8–4.4)
GLUCOSE BLD-MCNC: 97 MG/DL (ref 70–99)
HCT VFR BLD AUTO: 41.7 %
HGB BLD-MCNC: 13.8 G/DL
HGB RETIC QN AUTO: 36.7 PG (ref 28.2–36.6)
IMM GRANULOCYTES # BLD AUTO: 0.01 X10(3) UL (ref 0–1)
IMM GRANULOCYTES NFR BLD: 0.2 %
IMM RETICS NFR: 0.08 RATIO (ref 0.1–0.3)
IRON SATN MFR SERPL: 21 %
IRON SERPL-MCNC: 74 UG/DL
LYMPHOCYTES # BLD AUTO: 1.72 X10(3) UL (ref 1–4)
LYMPHOCYTES NFR BLD AUTO: 34.4 %
MCH RBC QN AUTO: 29.1 PG (ref 26–34)
MCHC RBC AUTO-ENTMCNC: 33.1 G/DL (ref 31–37)
MCV RBC AUTO: 88 FL
MONOCYTES # BLD AUTO: 0.39 X10(3) UL (ref 0.1–1)
MONOCYTES NFR BLD AUTO: 7.8 %
NEUTROPHILS # BLD AUTO: 2.61 X10 (3) UL (ref 1.5–7.7)
NEUTROPHILS # BLD AUTO: 2.61 X10(3) UL (ref 1.5–7.7)
NEUTROPHILS NFR BLD AUTO: 52.2 %
OSMOLALITY SERPL CALC.SUM OF ELEC: 285 MOSM/KG (ref 275–295)
PLATELET # BLD AUTO: 243 10(3)UL (ref 150–450)
POTASSIUM SERPL-SCNC: 3.5 MMOL/L (ref 3.5–5.1)
PROT SERPL-MCNC: 7.6 G/DL (ref 6.4–8.2)
RBC # BLD AUTO: 4.74 X10(6)UL
RETICS # AUTO: 64.5 X10(3) UL (ref 22.5–147.5)
RETICS/RBC NFR AUTO: 1.4 %
SODIUM SERPL-SCNC: 136 MMOL/L (ref 136–145)
TIBC SERPL-MCNC: 350 UG/DL (ref 240–450)
TRANSFERRIN SERPL-MCNC: 235 MG/DL (ref 200–360)
WBC # BLD AUTO: 5 X10(3) UL (ref 4–11)

## 2022-11-30 PROCEDURE — 71046 X-RAY EXAM CHEST 2 VIEWS: CPT | Performed by: NURSE PRACTITIONER

## 2022-11-30 PROCEDURE — 85025 COMPLETE CBC W/AUTO DIFF WBC: CPT | Performed by: NURSE PRACTITIONER

## 2022-11-30 PROCEDURE — 80053 COMPREHEN METABOLIC PANEL: CPT | Performed by: NURSE PRACTITIONER

## 2022-11-30 PROCEDURE — 3074F SYST BP LT 130 MM HG: CPT | Performed by: NURSE PRACTITIONER

## 2022-11-30 PROCEDURE — 99214 OFFICE O/P EST MOD 30 MIN: CPT | Performed by: NURSE PRACTITIONER

## 2022-11-30 PROCEDURE — 3078F DIAST BP <80 MM HG: CPT | Performed by: NURSE PRACTITIONER

## 2022-11-30 PROCEDURE — 82728 ASSAY OF FERRITIN: CPT | Performed by: NURSE PRACTITIONER

## 2022-11-30 PROCEDURE — 84466 ASSAY OF TRANSFERRIN: CPT | Performed by: NURSE PRACTITIONER

## 2022-11-30 PROCEDURE — 3008F BODY MASS INDEX DOCD: CPT | Performed by: NURSE PRACTITIONER

## 2022-11-30 PROCEDURE — 83540 ASSAY OF IRON: CPT | Performed by: NURSE PRACTITIONER

## 2022-11-30 PROCEDURE — 85045 AUTOMATED RETICULOCYTE COUNT: CPT | Performed by: NURSE PRACTITIONER

## 2022-12-01 ENCOUNTER — PATIENT MESSAGE (OUTPATIENT)
Dept: INTERNAL MEDICINE CLINIC | Facility: CLINIC | Age: 42
End: 2022-12-01

## 2022-12-01 NOTE — TELEPHONE ENCOUNTER
From: Evelyn Falk  To: STACY Polanco  Sent: 12/1/2022 10:04 AM CST  Subject: Blood work    Hi,    I saw a message that said all my bloodwork is normal. I noticed my BUN and BUN/CREAT ratio were a bit elevated. Is that anything to be concerned about? Are there any further steps to take?     Thanks,  Caroline Blood

## 2022-12-01 NOTE — TELEPHONE ENCOUNTER
Nothing to be concerned about  We will continue to monitor. Your creatinine (the other kidney function test) is within the normal limits without any concern.

## 2022-12-13 ENCOUNTER — OFFICE VISIT (OUTPATIENT)
Dept: OBGYN CLINIC | Facility: CLINIC | Age: 42
End: 2022-12-13
Payer: COMMERCIAL

## 2022-12-13 VITALS
BODY MASS INDEX: 24.83 KG/M2 | WEIGHT: 163.81 LBS | DIASTOLIC BLOOD PRESSURE: 86 MMHG | SYSTOLIC BLOOD PRESSURE: 119 MMHG | HEIGHT: 68 IN

## 2022-12-13 DIAGNOSIS — Z12.31 ENCOUNTER FOR SCREENING MAMMOGRAM FOR BREAST CANCER: ICD-10-CM

## 2022-12-13 DIAGNOSIS — Z01.419 ENCOUNTER FOR WELL WOMAN EXAM WITH ROUTINE GYNECOLOGICAL EXAM: Primary | ICD-10-CM

## 2022-12-13 DIAGNOSIS — N95.1 PERIMENOPAUSAL SYMPTOMS: ICD-10-CM

## 2022-12-13 PROCEDURE — 3008F BODY MASS INDEX DOCD: CPT | Performed by: OBSTETRICS & GYNECOLOGY

## 2022-12-13 PROCEDURE — 99386 PREV VISIT NEW AGE 40-64: CPT | Performed by: OBSTETRICS & GYNECOLOGY

## 2022-12-13 PROCEDURE — 3079F DIAST BP 80-89 MM HG: CPT | Performed by: OBSTETRICS & GYNECOLOGY

## 2022-12-13 PROCEDURE — 3074F SYST BP LT 130 MM HG: CPT | Performed by: OBSTETRICS & GYNECOLOGY

## 2022-12-13 RX ORDER — LEVONORGESTREL/ETHINYL ESTRADIOL 2.6; 2.3 MG/1; MG/1
1 PATCH TRANSDERMAL WEEKLY
Qty: 6 PATCH | Refills: 0 | COMMUNITY
Start: 2022-12-13 | End: 2023-01-10

## 2022-12-15 ENCOUNTER — HOSPITAL ENCOUNTER (OUTPATIENT)
Dept: MAMMOGRAPHY | Age: 42
Discharge: HOME | End: 2022-12-15
Attending: OBSTETRICS & GYNECOLOGY
Payer: COMMERCIAL

## 2022-12-15 DIAGNOSIS — Z12.31 ENCOUNTER FOR SCREENING MAMMOGRAM FOR BREAST CANCER: ICD-10-CM

## 2022-12-15 PROCEDURE — 77063 BREAST TOMOSYNTHESIS BI: CPT | Performed by: OBSTETRICS & GYNECOLOGY

## 2022-12-15 PROCEDURE — 77067 SCR MAMMO BI INCL CAD: CPT | Performed by: OBSTETRICS & GYNECOLOGY

## 2022-12-26 LAB — HPV I/H RISK 1 DNA SPEC QL NAA+PROBE: NEGATIVE

## 2023-02-07 ENCOUNTER — OFFICE VISIT (OUTPATIENT)
Dept: OBGYN CLINIC | Facility: CLINIC | Age: 43
End: 2023-02-07

## 2023-02-07 VITALS
WEIGHT: 163 LBS | BODY MASS INDEX: 24.71 KG/M2 | SYSTOLIC BLOOD PRESSURE: 114 MMHG | HEIGHT: 68 IN | DIASTOLIC BLOOD PRESSURE: 76 MMHG

## 2023-02-07 DIAGNOSIS — N95.1 PERIMENOPAUSAL SYMPTOMS: ICD-10-CM

## 2023-02-07 DIAGNOSIS — N92.6 IRREGULAR BLEEDING: Primary | ICD-10-CM

## 2023-02-07 PROCEDURE — 3008F BODY MASS INDEX DOCD: CPT | Performed by: OBSTETRICS & GYNECOLOGY

## 2023-02-07 PROCEDURE — 3078F DIAST BP <80 MM HG: CPT | Performed by: OBSTETRICS & GYNECOLOGY

## 2023-02-07 PROCEDURE — 3074F SYST BP LT 130 MM HG: CPT | Performed by: OBSTETRICS & GYNECOLOGY

## 2023-02-07 PROCEDURE — 99213 OFFICE O/P EST LOW 20 MIN: CPT | Performed by: OBSTETRICS & GYNECOLOGY

## 2023-02-07 RX ORDER — LEVONORGESTREL/ETHINYL ESTRADIOL 2.6; 2.3 MG/1; MG/1
1 PATCH TRANSDERMAL WEEKLY
Qty: 3 PATCH | Refills: 0 | Status: SHIPPED | OUTPATIENT
Start: 2023-02-07 | End: 2023-02-09

## 2023-03-06 RX ORDER — LEVONORGESTREL/ETHINYL ESTRADIOL 2.6; 2.3 MG/1; MG/1
1 PATCH TRANSDERMAL WEEKLY
Qty: 9 PATCH | Refills: 4 | Status: SHIPPED | OUTPATIENT
Start: 2023-03-06

## 2023-06-14 ENCOUNTER — HOSPITAL ENCOUNTER (OUTPATIENT)
Age: 43
Discharge: HOME | End: 2023-06-14
Payer: COMMERCIAL

## 2023-06-14 VITALS
TEMPERATURE: 97 F | SYSTOLIC BLOOD PRESSURE: 116 MMHG | OXYGEN SATURATION: 98 % | RESPIRATION RATE: 20 BRPM | HEART RATE: 60 BPM | DIASTOLIC BLOOD PRESSURE: 68 MMHG | HEIGHT: 68 IN | BODY MASS INDEX: 24.25 KG/M2 | WEIGHT: 160 LBS

## 2023-06-14 DIAGNOSIS — G43.009 MIGRAINE WITHOUT AURA AND WITHOUT STATUS MIGRAINOSUS, NOT INTRACTABLE: Primary | ICD-10-CM

## 2023-06-14 PROCEDURE — 99213 OFFICE O/P EST LOW 20 MIN: CPT | Performed by: PHYSICIAN ASSISTANT

## 2023-06-14 PROCEDURE — 96375 TX/PRO/DX INJ NEW DRUG ADDON: CPT | Performed by: PHYSICIAN ASSISTANT

## 2023-06-14 PROCEDURE — 96374 THER/PROPH/DIAG INJ IV PUSH: CPT | Performed by: PHYSICIAN ASSISTANT

## 2023-06-14 RX ORDER — OXYMETAZOLINE HYDROCHLORIDE 0.05 G/100ML
1 SPRAY NASAL 2 TIMES DAILY
Qty: 15 ML | Refills: 0 | Status: SHIPPED | OUTPATIENT
Start: 2023-06-14 | End: 2023-06-16

## 2023-06-14 RX ORDER — BUTALBITAL, ACETAMINOPHEN AND CAFFEINE 50; 325; 40 MG/1; MG/1; MG/1
1-2 TABLET ORAL EVERY 6 HOURS PRN
Qty: 10 TABLET | Refills: 0 | Status: SHIPPED | OUTPATIENT
Start: 2023-06-14 | End: 2023-06-19

## 2023-06-14 RX ORDER — ONDANSETRON 4 MG/1
4 TABLET, ORALLY DISINTEGRATING ORAL EVERY 4 HOURS PRN
Qty: 10 TABLET | Refills: 0 | Status: SHIPPED | OUTPATIENT
Start: 2023-06-14 | End: 2023-06-21

## 2023-06-14 RX ORDER — SODIUM CHLORIDE 9 MG/ML
1000 INJECTION, SOLUTION INTRAVENOUS ONCE
Status: COMPLETED | OUTPATIENT
Start: 2023-06-14 | End: 2023-06-14

## 2023-06-14 RX ORDER — DIPHENHYDRAMINE HYDROCHLORIDE 50 MG/ML
25 INJECTION INTRAMUSCULAR; INTRAVENOUS ONCE
Status: COMPLETED | OUTPATIENT
Start: 2023-06-14 | End: 2023-06-14

## 2023-06-14 RX ORDER — CETIRIZINE HYDROCHLORIDE 10 MG/1
10 TABLET ORAL DAILY
Qty: 30 TABLET | Refills: 0 | Status: SHIPPED | OUTPATIENT
Start: 2023-06-14 | End: 2023-07-14

## 2023-06-14 RX ORDER — KETOROLAC TROMETHAMINE 30 MG/ML
30 INJECTION, SOLUTION INTRAMUSCULAR; INTRAVENOUS ONCE
Status: COMPLETED | OUTPATIENT
Start: 2023-06-14 | End: 2023-06-14

## 2023-06-14 RX ORDER — METOCLOPRAMIDE HYDROCHLORIDE 5 MG/ML
10 INJECTION INTRAMUSCULAR; INTRAVENOUS ONCE
Status: COMPLETED | OUTPATIENT
Start: 2023-06-14 | End: 2023-06-14

## 2023-06-14 RX ORDER — FLUTICASONE PROPIONATE 50 MCG
2 SPRAY, SUSPENSION (ML) NASAL DAILY
Qty: 16 G | Refills: 0 | Status: SHIPPED | OUTPATIENT
Start: 2023-06-14

## 2023-06-16 ENCOUNTER — TELEPHONE (OUTPATIENT)
Dept: OBGYN CLINIC | Facility: CLINIC | Age: 43
End: 2023-06-16

## 2023-06-16 NOTE — TELEPHONE ENCOUNTER
Pt placed on Twirla in in Feb by Dr Stoney Bales. Pt now having chronic headaches wondering if any connection

## 2023-06-16 NOTE — TELEPHONE ENCOUNTER
Patient name and  verified. Patient prescribed twirla in February and complaning of headache since last week. Patient seen in UC and thought headache was r/t allergies. Patient not convenienced. Tylenol does help with some relief. Patient scheduled for OV on . Aware of scheduling details.

## 2023-06-22 ENCOUNTER — TELEPHONE (OUTPATIENT)
Dept: OBGYN CLINIC | Facility: CLINIC | Age: 43
End: 2023-06-22

## 2023-06-22 ENCOUNTER — OFFICE VISIT (OUTPATIENT)
Dept: OBGYN CLINIC | Facility: CLINIC | Age: 43
End: 2023-06-22

## 2023-06-22 VITALS
HEIGHT: 68 IN | BODY MASS INDEX: 24.71 KG/M2 | SYSTOLIC BLOOD PRESSURE: 114 MMHG | WEIGHT: 163 LBS | DIASTOLIC BLOOD PRESSURE: 72 MMHG

## 2023-06-22 DIAGNOSIS — N95.1 PERIMENOPAUSAL SYMPTOMS: ICD-10-CM

## 2023-06-22 DIAGNOSIS — N92.1 MENORRHAGIA WITH IRREGULAR CYCLE: Primary | ICD-10-CM

## 2023-06-22 DIAGNOSIS — N92.6 IRREGULAR BLEEDING: Primary | ICD-10-CM

## 2023-06-22 DIAGNOSIS — R23.2 HOT FLASHES: ICD-10-CM

## 2023-06-22 DIAGNOSIS — N92.1 MENORRHAGIA WITH IRREGULAR CYCLE: ICD-10-CM

## 2023-06-22 PROCEDURE — 3008F BODY MASS INDEX DOCD: CPT | Performed by: OBSTETRICS & GYNECOLOGY

## 2023-06-22 PROCEDURE — 3074F SYST BP LT 130 MM HG: CPT | Performed by: OBSTETRICS & GYNECOLOGY

## 2023-06-22 PROCEDURE — 3078F DIAST BP <80 MM HG: CPT | Performed by: OBSTETRICS & GYNECOLOGY

## 2023-06-22 PROCEDURE — 99213 OFFICE O/P EST LOW 20 MIN: CPT | Performed by: OBSTETRICS & GYNECOLOGY

## 2023-06-22 NOTE — TELEPHONE ENCOUNTER
SURGERY:   Hysteroscopy D & C endometrial Ablation andrea   DATE REQUESTED:  Per patient   Anesthesia:  MAC   ASSIST NEEDED:  None   PRE-OP WITH PCP: no     DX:   Menorrhagia with irregular cycle     Bruce Dumont MD

## 2023-06-23 NOTE — TELEPHONE ENCOUNTER
Called and spoke to pt. Pt agrees with surgery date/time. Minor case letter sent to pt's mychart.      Will continue to check for sooner availability per pt request.

## 2023-07-17 ENCOUNTER — OFFICE VISIT (OUTPATIENT)
Dept: OTOLARYNGOLOGY | Facility: CLINIC | Age: 43
End: 2023-07-17

## 2023-07-17 DIAGNOSIS — J30.9 CHRONIC ALLERGIC RHINITIS: Primary | ICD-10-CM

## 2023-07-17 DIAGNOSIS — J32.9 CHRONIC SINUSITIS, UNSPECIFIED LOCATION: ICD-10-CM

## 2023-07-17 RX ORDER — PREDNISONE 20 MG/1
40 TABLET ORAL DAILY
Qty: 10 TABLET | Refills: 0 | Status: SHIPPED | OUTPATIENT
Start: 2023-07-17 | End: 2023-07-22

## 2023-07-17 RX ORDER — AZELASTINE 1 MG/ML
2 SPRAY, METERED NASAL 2 TIMES DAILY
Qty: 30 ML | Refills: 0 | Status: SHIPPED | OUTPATIENT
Start: 2023-07-17

## 2023-07-17 RX ORDER — MONTELUKAST SODIUM 10 MG/1
10 TABLET ORAL NIGHTLY
Qty: 30 TABLET | Refills: 3 | Status: SHIPPED | OUTPATIENT
Start: 2023-07-17

## 2023-07-17 NOTE — PROGRESS NOTES
Barb Cheng is a 39year old female. Patient presents with:  Nose Problem: Patient is here due to congestion reports sinus pressure. Reports issues smelling. ASSESSMENT AND PLAN:   1. Chronic allergic rhinitis  23-year-old woman presents with chronic nasal congestion as well as vertigo and facial pressure. She had a CT of her brain several years ago that on the report says posterior ethmoid sinusitis as a unable to look at the images. She has been using Zyrtec occasionally. Reports allergy testing allergic to dogs has a nonallergenic dog. On exam septum appears straight does have inferior turbinate hypertrophy on both sides as well as possible polypoid changes of both her middle turbinates. Did not I did not see any gross purulence. Discussed these findings with her. We will first trial on a regimen of Astelin and Claritin-D and Singulair. We will also try a 5-day burst of prednisone for her loss of smell. She has a history of ulcerative colitis have taken lots of steroids in the past would like to stay away from a prolonged course. I also gave her a order for a CT if these issues are not resolved in about 4 weeks she can fill this we will investigate for any further sinus issues. Otherwise she can follow-up in 4 to 6 weeks if better we can discuss weaning her medications. 2. Chronic sinusitis, unspecified location    - CT SINUS [facility] (CPT=70486); Future      The patient indicates understanding of these issues and agrees to the plan.       EXAM:   [facility] 06/03/2023 (Exact Date)     Pertinent exam findings may also be noted above in assessment and plan     System Details   Skin Inspection - Normal.   Constitutional Overall appearance - Normal.   Head/Face Symmetric, TMJ tenderness not present    Eyes EOMI, PERRL   Right ear:  Canal clear, TM intact, no COLIN   Left ear:  Canal clear, TM intact, no COLIN   Nose: Septum midline, inferior turbinates not enlarged, nasal valves without collapse Oral cavity/Oropharynx: No lesions or masses on inspection or palpation, tonsils symmetric    Neck: Soft without LAD, thyroid not enlarged  Voice clear/ no stridor   Other:      Scopes and Procedures:     Nasal Endoscopy Procedure Note     Due to inability for adequate examination of the nose and nasopharynx and need for magnification to perform the examination, endoscopy was performed. Risks and benefits were discussed with patient/family and they have given verbal consent to proceed. Pre-operative Diagnosis:   Chronic allergic rhinitis  (primary encounter diagnosis)  Chronic sinusitis, unspecified location    Post-operative Diagnosis: Same    Procedure: Diagnostic nasal endoscopy    Anesthesia: Topical anesthetic [place]     Surgeon Georgia Martinez MD    EBL: 0cc    Procedure Detail & Findings:     After placement of topical anesthetic intranasally the endoscope was inserted into each nares and driven through the nasal cavity into the nasopharynx. The following findings were noted:    Septum: Midline  Inferior turbinates: hypertrophy both sides  Middle meatus: Patent  Middle turbinates: possible polypoid changes of both her middle turbinates. Purulence: None noted  Polyps: None noted  Nasopharynx and eustachian tube: No masses  Other: The middle and superior meatus, the turbinates, and the spheno-ethmoid recess were inspected and seen to be without significant abnormal findings. Condition: Stable    Complications: Patient tolerated the procedure well with no immediate complication. Pete Mitchell MD        REVIEW OF SYSTEMS:   GENERAL HEALTH: feels well otherwise  GENERAL : denies fever, chills, sweats, weight loss, weight gain  SKIN: denies any unusual skin lesions or rashes  RESPIRATORY: denies shortness of breath with exertion  NEURO: denies headaches    Current Outpatient Medications   Medication Sig Dispense Refill    azelastine 0.1 % Nasal Solution 2 sprays by Nasal route 2 (two) times daily.  30 mL 0    loratadine-pseudoephedrine ER  MG Oral Tablet 24 Hr Take 1 tablet by mouth at bedtime. 30 tablet 1    montelukast 10 MG Oral Tab Take 1 tablet (10 mg total) by mouth nightly. 30 tablet 3    predniSONE 20 MG Oral Tab Take 2 tablets (40 mg total) by mouth daily for 5 days. 10 tablet 0    fluticasone propionate 50 MCG/ACT Nasal Suspension 2 sprays by Nasal route daily. 16 g 0    TWIRLA 120-30 MCG/24HR Transdermal Patch Weekly Place 1 patch onto the skin once a week. 9 patch 4    ergocalciferol 1.25 MG (84880 UT) Oral Cap Take 1 capsule (50,000 Units total) by mouth every 7 days. Syringe/Needle, Disp, (BD INTEGRA SYRINGE) 25G X 1\" 3 ML Does not apply Misc USE AS DIRECTED 12 each 0    Vedolizumab (ENTYVIO IV) Inject into the vein.       Hydrocortisone Ace-Pramoxine 2.5-1 % Rectal Cream OREN TO ANAL AREA TWO TO TID  3      Past Medical History:   Diagnosis Date    Abdominal pain     Azotemia 1/2/2019    Celiac disease     Closed nondisplaced fracture of head of right radius, subsequent encounter 2/4/2019    Colitis     Contact dermatitis and other eczema, due to unspecified cause 6/29/2012    Exacerbation of ulcerative colitis with complication ([facility] [address] 0/0/8010    Hemorrhage of gastrointestinal tract 6/12/2006    Hemorrhage of rectum and anus     Ileus of unspecified type ([address] 1/2/2019    Leukocytosis 6/30/2018    Low vitamin B12 level 2014    monthly b12 injections    Pap smear for cervical cancer screening 07/24/2015    negative    Plantar warts     Ulcerative colitis ([facility] [address]     Wears glasses       Social History:  Social History     Socioeconomic History    Marital status:    Tobacco Use    Smoking status: Never    Smokeless tobacco: Never   Vaping Use    Vaping Use: Never used   Substance and Sexual Activity    Alcohol use: Yes     Comment: cage 7/16/19 - a few times per week    Drug use: No    Sexual activity: Yes     Partners: Male   Other Topics Concern    Caffeine Concern Yes     Comment: 1 cup coffee    Exercise Yes     Comment: 4 days week    Seat Belt Yes          Carlos Gomez MD  7/17/2023  2:58 PM

## 2023-07-18 ENCOUNTER — PATIENT MESSAGE (OUTPATIENT)
Dept: OTOLARYNGOLOGY | Facility: CLINIC | Age: 43
End: 2023-07-18

## 2023-07-20 RX ORDER — AZELASTINE 1 MG/ML
2 SPRAY, METERED NASAL 2 TIMES DAILY
Qty: 90 ML | Refills: 0 | OUTPATIENT
Start: 2023-07-20

## 2023-07-20 RX ORDER — LORATADINE/PSEUDOEPHEDRINE 10MG-240MG
1 TABLET, EXTENDED RELEASE 24 HR ORAL NIGHTLY
Qty: 90 TABLET | Refills: 0 | OUTPATIENT
Start: 2023-07-20

## 2023-07-21 NOTE — TELEPHONE ENCOUNTER
From: Zaheer Falk  To: Yahaira Cooper MD  Sent: 7/18/2023 8:25 AM CDT  Subject: Dahlia Akhtar,    I saw you yesterday for nasal congestion. After looking at the medications prescribed, I would like to move forward with the CT first. Based on my health history, if the imaging will give you a better idea of what is happening I would prefer that. I saw the order is in [address] [place] [address], I just wanted your approval to schedule.     Thanks,  Chante Falk

## 2023-07-31 ENCOUNTER — TELEPHONE (OUTPATIENT)
Dept: OTOLARYNGOLOGY | Facility: CLINIC | Age: 43
End: 2023-07-31

## 2023-07-31 ENCOUNTER — HOSPITAL ENCOUNTER (OUTPATIENT)
Dept: CT IMAGING | Age: 43
Discharge: HOME | End: 2023-07-31
Attending: STUDENT IN AN ORGANIZED HEALTH CARE EDUCATION/TRAINING PROGRAM
Payer: COMMERCIAL

## 2023-07-31 DIAGNOSIS — J32.9 CHRONIC SINUSITIS, UNSPECIFIED LOCATION: ICD-10-CM

## 2023-07-31 PROCEDURE — 70486 CT MAXILLOFACIAL W/O DYE: CPT | Performed by: STUDENT IN AN ORGANIZED HEALTH CARE EDUCATION/TRAINING PROGRAM

## 2023-08-01 ENCOUNTER — PATIENT MESSAGE (OUTPATIENT)
Dept: OTOLARYNGOLOGY | Facility: CLINIC | Age: 43
End: 2023-08-01

## 2023-08-04 RX ORDER — AMOXICILLIN AND CLAVULANATE POTASSIUM 875; 125 MG/1; MG/1
1 TABLET, FILM COATED ORAL EVERY 12 HOURS
Qty: 28 TABLET | Refills: 0 | Status: SHIPPED | OUTPATIENT
Start: 2023-08-04 | End: 2023-08-18

## 2023-08-04 NOTE — TELEPHONE ENCOUNTER
From: Chaka Falk  To: Cheikh Swartz MD  Sent: 8/1/2023 2:28 PM CDT  Subject: CT    Hello,    Based on the results of my CT, does the course of treatment change?     Thanks,  Murray Falk

## 2023-08-06 PROBLEM — N92.6 IRREGULAR MENSES: Status: ACTIVE | Noted: 2023-08-06

## 2023-08-07 ENCOUNTER — HOSPITAL ENCOUNTER (OUTPATIENT)
Facility: HOSPITAL | Age: 43
Setting detail: HOSPITAL OUTPATIENT SURGERY
Discharge: HOME | End: 2023-08-07
Attending: OBSTETRICS & GYNECOLOGY | Admitting: OBSTETRICS & GYNECOLOGY
Payer: COMMERCIAL

## 2023-08-07 ENCOUNTER — ANESTHESIA (OUTPATIENT)
Dept: SURGERY | Facility: HOSPITAL | Age: 43
End: 2023-08-07
Payer: COMMERCIAL

## 2023-08-07 ENCOUNTER — ANESTHESIA EVENT (OUTPATIENT)
Dept: SURGERY | Facility: HOSPITAL | Age: 43
End: 2023-08-07
Payer: COMMERCIAL

## 2023-08-07 VITALS
OXYGEN SATURATION: 100 % | TEMPERATURE: 98 F | SYSTOLIC BLOOD PRESSURE: 109 MMHG | WEIGHT: 166 LBS | HEART RATE: 58 BPM | BODY MASS INDEX: 25.16 KG/M2 | DIASTOLIC BLOOD PRESSURE: 72 MMHG | HEIGHT: 68 IN | RESPIRATION RATE: 16 BRPM

## 2023-08-07 DIAGNOSIS — N92.1 MENORRHAGIA WITH IRREGULAR CYCLE: ICD-10-CM

## 2023-08-07 LAB — B-HCG UR QL: NEGATIVE

## 2023-08-07 PROCEDURE — 58563 HYSTEROSCOPY ABLATION: CPT | Performed by: OBSTETRICS & GYNECOLOGY

## 2023-08-07 PROCEDURE — 0U5B8ZZ DESTRUCTION OF ENDOMETRIUM, VIA NATURAL OR ARTIFICIAL OPENING ENDOSCOPIC: ICD-10-PCS | Performed by: OBSTETRICS & GYNECOLOGY

## 2023-08-07 RX ORDER — ACETAMINOPHEN 500 MG
1000 TABLET ORAL ONCE
Status: COMPLETED | OUTPATIENT
Start: 2023-08-07 | End: 2023-08-07

## 2023-08-07 RX ORDER — HYDROMORPHONE HYDROCHLORIDE 1 MG/ML
0.4 INJECTION, SOLUTION INTRAMUSCULAR; INTRAVENOUS; SUBCUTANEOUS EVERY 5 MIN PRN
Status: DISCONTINUED | OUTPATIENT
Start: 2023-08-07 | End: 2023-08-07

## 2023-08-07 RX ORDER — SODIUM CHLORIDE, SODIUM LACTATE, POTASSIUM CHLORIDE, CALCIUM CHLORIDE 600; 310; 30; 20 MG/100ML; MG/100ML; MG/100ML; MG/100ML
INJECTION, SOLUTION INTRAVENOUS CONTINUOUS
Status: DISCONTINUED | OUTPATIENT
Start: 2023-08-07 | End: 2023-08-07

## 2023-08-07 RX ORDER — ONDANSETRON 4 MG/1
4 TABLET, FILM COATED ORAL EVERY 8 HOURS PRN
Status: DISCONTINUED | OUTPATIENT
Start: 2023-08-07 | End: 2023-08-07

## 2023-08-07 RX ORDER — KETOROLAC TROMETHAMINE 30 MG/ML
INJECTION, SOLUTION INTRAMUSCULAR; INTRAVENOUS AS NEEDED
Status: DISCONTINUED | OUTPATIENT
Start: 2023-08-07 | End: 2023-08-07 | Stop reason: SURG

## 2023-08-07 RX ORDER — MORPHINE SULFATE 4 MG/ML
4 INJECTION, SOLUTION INTRAMUSCULAR; INTRAVENOUS EVERY 10 MIN PRN
Status: DISCONTINUED | OUTPATIENT
Start: 2023-08-07 | End: 2023-08-07

## 2023-08-07 RX ORDER — MORPHINE SULFATE 10 MG/ML
6 INJECTION, SOLUTION INTRAMUSCULAR; INTRAVENOUS EVERY 10 MIN PRN
Status: DISCONTINUED | OUTPATIENT
Start: 2023-08-07 | End: 2023-08-07

## 2023-08-07 RX ORDER — MIDAZOLAM HYDROCHLORIDE 1 MG/ML
INJECTION INTRAMUSCULAR; INTRAVENOUS AS NEEDED
Status: DISCONTINUED | OUTPATIENT
Start: 2023-08-07 | End: 2023-08-07 | Stop reason: SURG

## 2023-08-07 RX ORDER — HYDROMORPHONE HYDROCHLORIDE 1 MG/ML
0.2 INJECTION, SOLUTION INTRAMUSCULAR; INTRAVENOUS; SUBCUTANEOUS EVERY 5 MIN PRN
Status: DISCONTINUED | OUTPATIENT
Start: 2023-08-07 | End: 2023-08-07

## 2023-08-07 RX ORDER — HYDROMORPHONE HYDROCHLORIDE 1 MG/ML
0.6 INJECTION, SOLUTION INTRAMUSCULAR; INTRAVENOUS; SUBCUTANEOUS EVERY 5 MIN PRN
Status: DISCONTINUED | OUTPATIENT
Start: 2023-08-07 | End: 2023-08-07

## 2023-08-07 RX ORDER — ONDANSETRON 2 MG/ML
4 INJECTION INTRAMUSCULAR; INTRAVENOUS EVERY 8 HOURS PRN
Status: DISCONTINUED | OUTPATIENT
Start: 2023-08-07 | End: 2023-08-07

## 2023-08-07 RX ORDER — NALOXONE HYDROCHLORIDE 0.4 MG/ML
80 INJECTION, SOLUTION INTRAMUSCULAR; INTRAVENOUS; SUBCUTANEOUS AS NEEDED
Status: DISCONTINUED | OUTPATIENT
Start: 2023-08-07 | End: 2023-08-07

## 2023-08-07 RX ORDER — MORPHINE SULFATE 4 MG/ML
2 INJECTION, SOLUTION INTRAMUSCULAR; INTRAVENOUS EVERY 10 MIN PRN
Status: DISCONTINUED | OUTPATIENT
Start: 2023-08-07 | End: 2023-08-07

## 2023-08-07 RX ORDER — LIDOCAINE HYDROCHLORIDE 10 MG/ML
INJECTION, SOLUTION EPIDURAL; INFILTRATION; INTRACAUDAL; PERINEURAL AS NEEDED
Status: DISCONTINUED | OUTPATIENT
Start: 2023-08-07 | End: 2023-08-07 | Stop reason: SURG

## 2023-08-07 RX ADMIN — SODIUM CHLORIDE, SODIUM LACTATE, POTASSIUM CHLORIDE, CALCIUM CHLORIDE: 600; 310; 30; 20 INJECTION, SOLUTION INTRAVENOUS at 10:15:00

## 2023-08-07 RX ADMIN — MIDAZOLAM HYDROCHLORIDE 2 MG: 1 INJECTION INTRAMUSCULAR; INTRAVENOUS at 09:51:00

## 2023-08-07 RX ADMIN — LIDOCAINE HYDROCHLORIDE 50 MG: 10 INJECTION, SOLUTION EPIDURAL; INFILTRATION; INTRACAUDAL; PERINEURAL at 09:51:00

## 2023-08-07 RX ADMIN — KETOROLAC TROMETHAMINE 30 MG: 30 INJECTION, SOLUTION INTRAMUSCULAR; INTRAVENOUS at 10:15:00

## 2023-08-07 RX ADMIN — SODIUM CHLORIDE, SODIUM LACTATE, POTASSIUM CHLORIDE, CALCIUM CHLORIDE: 600; 310; 30; 20 INJECTION, SOLUTION INTRAVENOUS at 09:51:00

## 2023-08-07 NOTE — OPERATIVE REPORT
[facility] POST ANESTHESIA CARE UNIT  Operative Note     Sharon Falk Location: OR   CSN [id number] MRN [id number]   Admission Date 8/7/2023 Operation Date 8/7/2023   Attending Physician Elba Prince MD Operating Physician Blaine Johansen MD      Preoperative Diagnosis: Menorrhagia with irregular cycle [N92.1]     Postoperative Diagnosis: Menorrhagia with irregular cycle [N92.1]     Procedure Performed:   Hysteroscopy, dilation and curettage with endometrial ablation novasure     Primary Surgeon: Blaine Johansen MD      Assistant: none      Surgical Findings: thickened endometrium      Anesthesia: MAC     Complications: none     Implants: none      Specimen: Endometrial curetting     Drains: none     Condition: Stable      Estimated Blood Loss: No data recorded     Summary of Case: Hysteroscopy, D&C, Endometrial Ablation    Operative Findings:    The patient was taken to the procedure room and after adequate induction of MAC anesthesia was placed in the dorsal lithotomy position, prepped and draped in the usual sterile fashion. Bimanual exam revealed anteverted uterus, no adnexal masses. Cervix was grasped with a single-tooth tenaculum and gently dilated up to 7.5mm. Through this, a 30-degree hysteroscope was placed with excellent visualization of the endometrial cavity. [place]ia were identified bilaterally. Polyps were seen. These were curetted a trueclear device, easily removed. The Novasure Ablation device was then placed, and measurements were taken. A perforation test was performed and passed. the machine was enabled 45 seconds. All instruments were then removed. Sponge, instrument and needle count was correct. The patient went to recovery room in good condition.        Blaine Johansen MD  8/7/2023  10:29 AM

## 2023-08-09 NOTE — TELEPHONE ENCOUNTER
Per Dr. Paddy Gerber result notes on 8/4/23, he has discussed CT sinus results with patient and plan of care.

## 2023-08-24 ENCOUNTER — OFFICE VISIT (OUTPATIENT)
Dept: OBGYN CLINIC | Facility: CLINIC | Age: 43
End: 2023-08-24

## 2023-08-24 VITALS
WEIGHT: 166 LBS | BODY MASS INDEX: 25.16 KG/M2 | SYSTOLIC BLOOD PRESSURE: 119 MMHG | HEIGHT: 68 IN | DIASTOLIC BLOOD PRESSURE: 83 MMHG

## 2023-08-24 DIAGNOSIS — N92.1 MENORRHAGIA WITH IRREGULAR CYCLE: Primary | ICD-10-CM

## 2023-08-24 PROCEDURE — 3079F DIAST BP 80-89 MM HG: CPT | Performed by: OBSTETRICS & GYNECOLOGY

## 2023-08-24 PROCEDURE — 3008F BODY MASS INDEX DOCD: CPT | Performed by: OBSTETRICS & GYNECOLOGY

## 2023-08-24 PROCEDURE — 3074F SYST BP LT 130 MM HG: CPT | Performed by: OBSTETRICS & GYNECOLOGY

## 2023-08-24 PROCEDURE — 99212 OFFICE O/P EST SF 10 MIN: CPT | Performed by: OBSTETRICS & GYNECOLOGY

## 2023-08-24 NOTE — PROGRESS NOTES
Post-op follow-up      Visit Type:  Post-operative follow-up    Date of Procedure:  08/07/2023    Procedure:  Hysteroscopy, dilation and curettage with endometrial ablation novasure    Indications for Procedure:  Menorrhagia with irregular cycle    Pathology Report:  Benign    Surgery/Post-op Complications:     Pain:  None    Medications pertaining to Surgery:  No    Incision (if applicable):  n/a    Diet: Normal    Voiding:  Normal    Bowel movements/Flatus:  Normal    Activity: Normal    Problems: Pt reports doing well with no concerns. /83   Ht 5' 8\" (1.727 m)   Wt 166 lb (75.3 kg)   LMP 07/29/2023 (Exact Date)     Wt Readings from Last 3 Encounters:  08/24/23 : 166 lb (75.3 kg)  08/07/23 : 166 lb (75.3 kg)  06/22/23 : 163 lb (73.9 kg)      Colorectal Cancer Screening due on 08/03/2020  Influenza Vaccine(1) due on 10/01/2023  Annual Physical due on 12/13/2023  Mammogram due on 12/15/2023  Pneumococcal Vaccine: Birth to 64yrs(3 - PPSV23 or PCV20) due on 02/18/2025  Pap Smear due on 12/13/2025  DTaP,Tdap,and Td Vaccines(2 - Td or Tdap) due on 05/26/2027  Annual Depression Screening Completed  COVID-19 Vaccine Completed      Review of Systems   General: Present- Feeling well. Not Present- Fever. Female Genitourinary: Not Present- Dysmenorrhea, Dyspareunia, Flank Pain, Frequency, Menstrual Irregularities, Pelvic Pain, Urgency, Urinary Complaints, Vaginal Bleeding and Vaginal dryness. Pain: Present- Pain Rating - 0 on a 0-10 scale. All other systems negative       Physical Exam   The physical exam findings are as follows: Abdomen   Inspection: - Inspection Normal.  Palpation/Percussion: Palpation and Percussion of the abdomen reveal - Non Tender, No hepatosplenomegaly and No Palpable abdominal masses. Female Genitourinary     External Genitalia   Perineum - Normal. Bartholin's Gland - Bilateral - Normal. Clitoris - Normal.  Introitus: Characteristics - Normal. Discharge - None.   Labia Majora: Lesions - Bilateral - None. Characteristics - Bilateral - Normal.  Labia Minora: Lesions - Bilateral - None. Characteristics - Bilateral - Normal.  Urethra: Characteristics - Normal. Discharge - None. [place] Gland - Bilateral - Normal.  Vulva: Characteristics - Normal. Lesions - None. Speculum & Bimanual   Vagina:   Vaginal Wall: - Normal.  Vaginal Lesions - None. Vaginal Mucosa - Normal.  Cervix: Characteristics - No Motion tenderness. Discharge - None. Uterus: Characteristics - Non Tender. Position - Midposition. Adnexa: Characteristics - Bilateral - Tender. Masses - No Adnexal Masses. Bladder - Normal.    Rectal   Anorectal Exam: External - normal external exam.    Lymphatic  General Lymphatics   Description - Normal .    May Return to normal activity patient to call if bleeding if not improved after 3 months    1.  Menorrhagia with irregular cycle

## 2023-11-12 ENCOUNTER — IMMUNIZATION (OUTPATIENT)
Dept: LAB | Age: 43
End: 2023-11-12
Attending: EMERGENCY MEDICINE
Payer: COMMERCIAL

## 2023-11-12 DIAGNOSIS — Z23 NEED FOR VACCINATION: Primary | ICD-10-CM

## 2023-11-12 PROCEDURE — 90471 IMMUNIZATION ADMIN: CPT

## 2023-11-12 PROCEDURE — 90686 IIV4 VACC NO PRSV 0.5 ML IM: CPT

## 2023-11-28 ENCOUNTER — MED REC SCAN ONLY (OUTPATIENT)
Dept: INTERNAL MEDICINE CLINIC | Facility: CLINIC | Age: 43
End: 2023-11-28

## 2024-01-07 ENCOUNTER — OFFICE VISIT (OUTPATIENT)
Dept: FAMILY MEDICINE CLINIC | Facility: CLINIC | Age: 44
End: 2024-01-07
Payer: COMMERCIAL

## 2024-01-07 VITALS
RESPIRATION RATE: 16 BRPM | OXYGEN SATURATION: 99 % | DIASTOLIC BLOOD PRESSURE: 74 MMHG | SYSTOLIC BLOOD PRESSURE: 133 MMHG | WEIGHT: 171.38 LBS | TEMPERATURE: 98 F | HEART RATE: 78 BPM | BODY MASS INDEX: 25.97 KG/M2 | HEIGHT: 68 IN

## 2024-01-07 DIAGNOSIS — H66.001 NON-RECURRENT ACUTE SUPPURATIVE OTITIS MEDIA OF RIGHT EAR WITHOUT SPONTANEOUS RUPTURE OF TYMPANIC MEMBRANE: Primary | ICD-10-CM

## 2024-01-07 DIAGNOSIS — J06.9 UPPER RESPIRATORY INFECTION, ACUTE: ICD-10-CM

## 2024-01-07 PROCEDURE — 3078F DIAST BP <80 MM HG: CPT | Performed by: PHYSICIAN ASSISTANT

## 2024-01-07 PROCEDURE — 3008F BODY MASS INDEX DOCD: CPT | Performed by: PHYSICIAN ASSISTANT

## 2024-01-07 PROCEDURE — 99213 OFFICE O/P EST LOW 20 MIN: CPT | Performed by: PHYSICIAN ASSISTANT

## 2024-01-07 PROCEDURE — 3075F SYST BP GE 130 - 139MM HG: CPT | Performed by: PHYSICIAN ASSISTANT

## 2024-01-07 RX ORDER — AMOXICILLIN 875 MG/1
875 TABLET, COATED ORAL 2 TIMES DAILY
Qty: 20 TABLET | Refills: 0 | Status: SHIPPED | OUTPATIENT
Start: 2024-01-07 | End: 2024-01-17

## 2024-01-07 NOTE — PROGRESS NOTES
Chief Complaint   Patient presents with    Ear Problem     Rt ear pain,sound is muffled, Lf ear uncomfortable when yawning, body aches, nasal drainage causing cough   Denies fever, headache        HPI:   Hawa Falk is a 43 year old female who presents for pain since yesterday, sinus congestion and drainage 3-4 days.  for Patient had no known exposure to COVID. States few back from a trip yesterday and felt more pressure in ear, feels like fluid with mild muffled hearing.  Mild symptoms at this time. Associated symptoms are as follows:  Fever/chills/sweats: no  Sore throat: no  Cough: slight  Nasal Congestion: yes  Body ache: mild  Rash:no  Headache:no  SOB/Dyspnea:no  Loss of taste: no  Loss of smell: no  Abdominal pain: no  Diarrhea: no  Vomiting:no   Patient is eating and drinking well. Has treated symptoms with nothing recently.         Current Outpatient Medications   Medication Sig Dispense Refill           MAGNESIUM OR Take 1 tablet by mouth daily.      Cyanocobalamin (VITAMIN B 12 OR) Take 1 tablet by mouth daily.      ergocalciferol 1.25 MG (83531 UT) Oral Cap Take 1 capsule (50,000 Units total) by mouth every 7 days.      Syringe/Needle, Disp, (BD INTEGRA SYRINGE) 25G X 1\" 3 ML Does not apply Misc USE AS DIRECTED 12 each 0    Vedolizumab (ENTYVIO IV) Inject 1 Dose into the vein As Directed.      Hydrocortisone Ace-Pramoxine 2.5-1 % Rectal Cream OREN TO ANAL AREA TWO TO TID (Patient not taking: Reported on 1/7/2024)  3      Past Medical History:   Diagnosis Date    Abdominal pain     Abnormal uterine bleeding     Anemia     Azotemia 01/02/2019    Celiac disease     Closed nondisplaced fracture of head of right radius, subsequent encounter 02/04/2019    Colitis     Contact dermatitis and other eczema, due to unspecified cause 06/29/2012    Exacerbation of ulcerative colitis with complication (HCC) 01/02/2019    Hemorrhage of gastrointestinal tract 06/12/2006    Hemorrhage of rectum and anus     Ileus of  unspecified type (HCC) 2019    Leukocytosis 2018    Low vitamin B12 level 2014    monthly b12 injections    Pap smear for cervical cancer screening 2015    negative    Plantar warts     Ulcerative colitis (HCC)     Visual impairment     contacts    Wears glasses       Past Surgical History:   Procedure Laterality Date          ,    COLONOSCOPY  2010    (fiberoptic), ULCERATIVE COLITIS    COLONOSCOPY  2012    Complete    COLONOSCOPY  10/06/2008    w/biopsy  Jeff Sierra MD    COLONOSCOPY  2015    DR. DUFF Q2YRS    COLONOSCOPY  2017    Dr.Sushama Duff repeat in 3 years     HYSTEROSCOPY,WITH ENDOMETRIAL  2023    Hysteroscopy, dilation and curettage with endometrial ablation novasure    INSERT INTRAUTERINE DEVICE      OTHER SURGICAL HISTORY  07 & 5/5/10    C-Sections    REMOVE INTRAUTERINE DEVICE      SIGMOIDOSCOPY,DIAGNOSTIC      UPPER GI ENDOSCOPY PERFORMED  2015    DR. DUFF      Family History   Problem Relation Age of Onset    Other (Other) Mother         autoimmune ITB    Other (Idiopathic Thrombocytopenic Purpura) Mother     Cancer Paternal Grandmother         Ovarian Cancer    Ovarian Cancer Paternal Grandmother         HER 70,S    Stroke Maternal Grandfather     Other (Cardiac Failure) Maternal Grandfather     Diabetes Maternal Grandfather     Diabetes Paternal Grandfather     Other (Other) Paternal Grandfather     Diabetes Maternal Grandfather       Social History     Socioeconomic History    Marital status:    Tobacco Use    Smoking status: Former     Types: Cigarettes     Quit date:      Years since quittin.0    Smokeless tobacco: Never   Vaping Use    Vaping Use: Never used   Substance and Sexual Activity    Alcohol use: Yes     Alcohol/week: 2.0 standard drinks of alcohol     Types: 2 Glasses of wine per week     Comment: occasionally    Drug use: No    Sexual activity: Yes     Partners: Male    Other Topics Concern    Caffeine Concern Yes     Comment: 1 cup coffee    Exercise Yes     Comment: 4 days week    Seat Belt Yes         REVIEW OF SYSTEMS:   GENERAL:  good appetite  SKIN: no rashes or abnormal skin lesions  HEENT: See HPI.    LUNGS: denies shortness of breath or wheezing   CARDIOVASCULAR: denies chest pain or palpitations   GI: denies abdominal pain or nausea  NEURO: no sinus headache, denies dizziness    EXAM:   /74   Pulse 78   Temp 97.6 °F (36.4 °C)   Resp 16   Ht 5' 8\" (1.727 m)   Wt 171 lb 6.4 oz (77.7 kg)   LMP 08/18/2023 (Approximate)   SpO2 99%   BMI 26.06 kg/m²   GENERAL: alert and oriented x 3, no acute distress  SKIN: no rashes, no suspicious lesions  HEAD: atraumatic, normocephalic, non tenderness on palpation of maxillary sinuses, no frontal sinus tenderness  EYES: conjunctiva clear, EOM intact  EARS: right TM with mild erythema and bulging, or retraction bilaterally, mild purulent fluid, bony landmarks intact, left TM with scant erythema, no bulging, mild serous fluid.  NOSE: nostrils patent, turbinates with mild swelling, nasal mucous, nasal mucosa pink and moist  THROAT: oral mucosa pink and moist. No visible dental caries. Posterior pharynx with no erythema, no exudates, tonsils 1/4, uvula midline, no abscess  NECK: supple, non-tender  LUNGS: non labored breathing,  clear to auscultation bilaterally, no wheezing, rales, or rhonchi.  CARDIO: RRR without murmur  EXTREMITIES: no cyanosis, clubbing or edema  LYMPH:  no cervical, submandibular, periauricular, or supraclavicular lymphadenopathy.      No results found for this or any previous visit (from the past 24 hour(s)).    ASSESSMENT:   Hawa Falk is a 43 year old female who presents with Ear Problem (Rt ear pain,sound is muffled, Lf ear uncomfortable when yawning, body aches, nasal drainage causing cough /Denies fever, headache ). Symptoms are consistent with:    Encounter Diagnoses   Name Primary?     Non-recurrent acute suppurative otitis media of right ear without spontaneous rupture of tympanic membrane Yes    Upper respiratory infection, acute        PLAN   Maintain fluid intake for continued hydration. Nasal saline to keep nasal passages moist and promote sinus drainage. Tylenol/motrin prn .  Antihistamine for congestion.  Total time of visit including but not limited to obtaining and reviewing history, medically appropriate evaluation, counseling/education greater than 20 minutes .   Patient instructions also in MyChart.   Risks, benefits, and side effects of medication explained and discussed. Medications as listed below.        Medications    amoxicillin 875 MG Oral Tab     Follow up prn or with pcp/clinic if symptoms develop / worsen within 1-2 days as discussed, ED precautions advised.   Patient understands and agrees with plan.     Sanjana Eduardo PA-C  1/7/2024  2:14 PM

## 2024-01-15 ENCOUNTER — HOSPITAL ENCOUNTER (OUTPATIENT)
Dept: MAMMOGRAPHY | Age: 44
Discharge: HOME | End: 2024-01-15
Attending: OBSTETRICS & GYNECOLOGY
Payer: COMMERCIAL

## 2024-01-15 DIAGNOSIS — R92.30 DENSE BREAST: Primary | ICD-10-CM

## 2024-01-15 DIAGNOSIS — Z12.31 ENCOUNTER FOR SCREENING MAMMOGRAM FOR BREAST CANCER: ICD-10-CM

## 2024-01-15 DIAGNOSIS — R92.343 EXTREMELY DENSE TISSUE OF BOTH BREASTS ON MAMMOGRAPHY: Primary | ICD-10-CM

## 2024-01-15 PROCEDURE — 77067 SCR MAMMO BI INCL CAD: CPT | Performed by: OBSTETRICS & GYNECOLOGY

## 2024-01-15 PROCEDURE — 77063 BREAST TOMOSYNTHESIS BI: CPT | Performed by: OBSTETRICS & GYNECOLOGY

## 2024-03-24 ENCOUNTER — APPOINTMENT (OUTPATIENT)
Dept: GENERAL RADIOLOGY | Age: 44
End: 2024-03-24
Attending: NURSE PRACTITIONER
Payer: COMMERCIAL

## 2024-03-24 ENCOUNTER — HOSPITAL ENCOUNTER (OUTPATIENT)
Age: 44
Discharge: HOME | End: 2024-03-24
Payer: COMMERCIAL

## 2024-03-24 VITALS
SYSTOLIC BLOOD PRESSURE: 131 MMHG | TEMPERATURE: 99 F | OXYGEN SATURATION: 97 % | DIASTOLIC BLOOD PRESSURE: 86 MMHG | HEART RATE: 78 BPM | RESPIRATION RATE: 20 BRPM

## 2024-03-24 DIAGNOSIS — J06.9 VIRAL URI WITH COUGH: Primary | ICD-10-CM

## 2024-03-24 PROCEDURE — 71046 X-RAY EXAM CHEST 2 VIEWS: CPT | Performed by: NURSE PRACTITIONER

## 2024-03-24 PROCEDURE — 99213 OFFICE O/P EST LOW 20 MIN: CPT | Performed by: NURSE PRACTITIONER

## 2024-03-24 RX ORDER — BENZONATATE 100 MG/1
100 CAPSULE ORAL 3 TIMES DAILY PRN
Qty: 30 CAPSULE | Refills: 0 | Status: SHIPPED | OUTPATIENT
Start: 2024-03-24 | End: 2024-04-23

## 2024-03-24 RX ORDER — ALBUTEROL SULFATE 90 UG/1
2 AEROSOL, METERED RESPIRATORY (INHALATION) EVERY 4 HOURS PRN
Qty: 1 EACH | Refills: 0 | Status: SHIPPED | OUTPATIENT
Start: 2024-03-24 | End: 2024-04-23

## 2024-03-24 NOTE — DISCHARGE INSTRUCTIONS
Please take Tessalon Perles.  Albuterol inhaler at 2 puffs every 4 hours as needed.  Follow closely with your primary.

## 2024-03-24 NOTE — ED INITIAL ASSESSMENT (HPI)
Pt began 5 weeks ago with a URI that then developed into a cough.  The cough continues causing her to be fatigued, and with chest wall soreness, and wakes her at night  it is productive for thick sputum, but no fever

## 2024-03-28 ENCOUNTER — OFFICE VISIT (OUTPATIENT)
Dept: INTERNAL MEDICINE CLINIC | Facility: CLINIC | Age: 44
End: 2024-03-28
Payer: COMMERCIAL

## 2024-03-28 VITALS
SYSTOLIC BLOOD PRESSURE: 110 MMHG | HEART RATE: 82 BPM | BODY MASS INDEX: 26 KG/M2 | TEMPERATURE: 97 F | DIASTOLIC BLOOD PRESSURE: 78 MMHG | OXYGEN SATURATION: 98 % | WEIGHT: 171.19 LBS

## 2024-03-28 DIAGNOSIS — R05.2 SUBACUTE COUGH: Primary | ICD-10-CM

## 2024-03-28 DIAGNOSIS — D84.9 IMMUNOSUPPRESSION (HCC): ICD-10-CM

## 2024-03-28 DIAGNOSIS — J06.9 UPPER RESPIRATORY TRACT INFECTION, UNSPECIFIED TYPE: ICD-10-CM

## 2024-03-28 PROCEDURE — 3074F SYST BP LT 130 MM HG: CPT | Performed by: NURSE PRACTITIONER

## 2024-03-28 PROCEDURE — 3078F DIAST BP <80 MM HG: CPT | Performed by: NURSE PRACTITIONER

## 2024-03-28 PROCEDURE — 99214 OFFICE O/P EST MOD 30 MIN: CPT | Performed by: NURSE PRACTITIONER

## 2024-03-28 RX ORDER — AMOXICILLIN AND CLAVULANATE POTASSIUM 875; 125 MG/1; MG/1
1 TABLET, FILM COATED ORAL 2 TIMES DAILY
Qty: 20 TABLET | Refills: 0 | Status: SHIPPED | OUTPATIENT
Start: 2024-03-28 | End: 2024-04-07

## 2024-03-28 RX ORDER — CODEINE PHOSPHATE AND GUAIFENESIN 10; 100 MG/5ML; MG/5ML
10 SOLUTION ORAL NIGHTLY PRN
Qty: 240 ML | Refills: 0 | Status: SHIPPED | OUTPATIENT
Start: 2024-03-28

## 2024-03-28 RX ORDER — FLUTICASONE PROPIONATE AND SALMETEROL XINAFOATE 115; 21 UG/1; UG/1
2 AEROSOL, METERED RESPIRATORY (INHALATION) 2 TIMES DAILY
Qty: 1 EACH | Refills: 2 | Status: SHIPPED | OUTPATIENT
Start: 2024-03-28

## 2024-03-28 NOTE — PROGRESS NOTES
Hawa Falk is a 43 year old female.    Chief Complaint   Patient presents with    Urgent Care F/u     Persistent cough post covid        HPI:   here for UC follow up   seen at  3/24 for cough.  Dx viral URI treated with albuterol and benzonate   She does not feel she is getting better.   Cough on and off since she had covid that never really went away.   Started to feel ill on Sunday with worsening cough  yellow sputum.  No sinus congestion.  Sinus surgery in oct. Daily rinses  No fever or chills  more fatigue.  CXR at  negative.      Covid in Feb     Immunosuppression for UC.  Follows with Dr Oscar Bolanos  colonoscopy done earlier this month      Patient Active Problem List   Diagnosis    Celiac disease (HCC)    Left sided colitis with rectal bleeding (HCC)    Regional enteritis of unspecified site    Low vitamin B12 level    Contraceptive surveillance    Immunosuppression (HCC)    Iron deficiency anemia    Ulcerative (chronic) proctitis (HCC)    Vertigo    Irregular menses    Extremely dense tissue of both breasts on mammography     Current Outpatient Medications   Medication Sig Dispense Refill    amoxicillin clavulanate 875-125 MG Oral Tab Take 1 tablet by mouth 2 (two) times daily for 10 days. 20 tablet 0    fluticasone-salmeterol (ADVAIR HFA) 115-21 MCG/ACT Inhalation Aerosol Inhale 2 puffs into the lungs 2 (two) times daily. 1 each 2    benzonatate 100 MG Oral Cap Take 1 capsule (100 mg total) by mouth 3 (three) times daily as needed for cough. 30 capsule 0    albuterol 108 (90 Base) MCG/ACT Inhalation Aero Soln Inhale 2 puffs into the lungs every 4 (four) hours as needed for Wheezing. 1 each 0    MAGNESIUM OR Take 1 tablet by mouth daily.      Cyanocobalamin (VITAMIN B 12 OR) Take 1 tablet by mouth daily.      ergocalciferol 1.25 MG (49318 UT) Oral Cap Take 1 capsule (50,000 Units total) by mouth every 7 days.      Vedolizumab (ENTYVIO IV) Inject 1 Dose into the vein As Directed.       Hydrocortisone Ace-Pramoxine 2.5-1 % Rectal Cream   3    Syringe/Needle, Disp, (BD INTEGRA SYRINGE) 25G X 1\" 3 ML Does not apply Misc USE AS DIRECTED (Patient not taking: Reported on 3/28/2024) 12 each 0      Past Medical History:   Diagnosis Date    Abdominal pain     Abnormal uterine bleeding     Anemia     Azotemia 2019    Celiac disease (HCC)     Closed nondisplaced fracture of head of right radius, subsequent encounter 2019    Colitis     Contact dermatitis and other eczema, due to unspecified cause 2012    Exacerbation of ulcerative colitis with complication (HCC) 2019    Hemorrhage of gastrointestinal tract 2006    Hemorrhage of rectum and anus     Ileus of unspecified type ([facility]) 2019    Leukocytosis 2018    Low vitamin B12 level 2014    monthly b12 injections    Pap smear for cervical cancer screening 2015    negative    Plantar warts     Ulcerative colitis ([facility])     Visual impairment     contacts    Wears glasses       Social History:  Social History     Socioeconomic History    Marital status:    Tobacco Use    Smoking status: Former     Types: Cigarettes     Quit date:      Years since quittin.2    Smokeless tobacco: Never   Vaping Use    Vaping Use: Never used   Substance and Sexual Activity    Alcohol use: Yes     Alcohol/week: 2.0 standard drinks of alcohol     Types: 2 Glasses of wine per week     Comment: occasionally    Drug use: No    Sexual activity: Yes     Partners: Male   Other Topics Concern    Caffeine Concern Yes     Comment: 1 cup coffee    Exercise Yes     Comment: 4 days week    Seat Belt Yes     Family History   Problem Relation Age of Onset    Other (Other) Mother         autoimmune ITB    Other (Idiopathic Thrombocytopenic Purpura) Mother     Cancer Paternal Grandmother         Ovarian Cancer    Ovarian Cancer Paternal Grandmother         HER 70,S    Stroke Maternal Grandfather     Other (Cardiac Failure) Maternal  Grandfather     Diabetes Maternal Grandfather     Diabetes Paternal Grandfather     Other (Other) Paternal Grandfather     Diabetes Maternal Grandfather         Allergies  Allergies   Allergen Reactions    Gluten OTHER (SEE COMMENTS)     Celiac disease       REVIEW OF SYSTEMS:   GENERAL HEALTH: as above   RESPIRATORY: no cough  CARDIOVASCULAR: denies chest pain on exertion, no palpatations  GI: denies abdominal pain and denies heartburn, no diarrhea or constipation  MUSCULOSKELETAL:  No arthralgias or myalgias  NEURO: denies headaches,     EXAM:   /78 (BP Location: Right arm, Patient Position: Sitting, Cuff Size: adult)   Pulse 82   Temp 96.7 °F (35.9 °C) (Temporal)   Wt 171 lb 3.2 oz (77.7 kg)   LMP 08/18/2023 (Approximate)   SpO2 98%   BMI 26.03 kg/m²   GENERAL: well developed, well nourished,in no apparent distress  HEENT: atraumatic, normocephalic,ears with mild fluid bilaterally  no erythema.  Throat clear  NECK: supple,no adenopathy,  LUNGS: normal rate without respiratory distress, lungs clear to auscultation no wheezing.   CARDIO: RRR without murmur  PSYCH: alert and oriented x 3    ASSESSMENT AND PLAN:     Encounter Diagnoses   Name Primary?    Subacute cough  avoid oral steroids if possible. Advair bid, cheratussin ac hs only  Warned pt about sedation with this medication and advised pt about necessary precautions and activities to avoid. Yes    Upper respiratory tract infection, unspecified type augmentin bid x 10 days.  Call if unresolved.      Immunosuppression (HCC)  ENtyvio.  Would like to avoid oral steroids if possibel as she has been on and off multiple times.  Will see if steroid inhlaer will help instead  monitor closely  follows at [facility].  Had colonscopy recently.         No orders of the defined types were placed in this encounter.      Meds & Refills for this Visit:  Requested Prescriptions     Signed Prescriptions Disp Refills    amoxicillin clavulanate 875-125 MG Oral Tab 20  tablet 0     Sig: Take 1 tablet by mouth 2 (two) times daily for 10 days.    fluticasone-salmeterol (ADVAIR HFA) 115-21 MCG/ACT Inhalation Aerosol 1 each 2     Sig: Inhale 2 puffs into the lungs 2 (two) times daily.       Imaging & Consults:  None    No follow-ups on file.  There are no Patient Instructions on file for this visit.

## 2024-05-14 ENCOUNTER — PATIENT MESSAGE (OUTPATIENT)
Dept: INTERNAL MEDICINE CLINIC | Facility: CLINIC | Age: 44
End: 2024-05-14

## 2024-05-16 NOTE — TELEPHONE ENCOUNTER
From: Hawa Falk  To: Evelyn Iqbal  Sent: 5/14/2024 12:24 PM CDT  Subject: Cough    Hello,    I saw you at the end of March for a cough. It has not completely gone away. Things like laughing or anything mint make it worse. Should I be concerned it is still lingering? Do I need to schedule a follow up?    Thanks,  Hawa Falk  
Last appointment 3/28/24 for persistent cough post Covid. Still with lingering cough, worse when laughing or eating mint.    Routing to Chanelle Iqbal for review and recommendations.    
Needs ov.   
Ok to use SDA or HFU if needed.   
Patient scheduled on 5/29.  Any sooner availability? OK to schedule in TCM/HFU or SDA slot?  
no

## 2024-05-28 NOTE — PROGRESS NOTES
Hawa Falk is a 43 year old female.    Chief Complaint   Patient presents with    Follow - Up     ES rm - 10 - f/u for cough       HPI:   here for follow up cough.  Ov march s/p UC visit for viral URI.  Cough was quite intense and she was not improving with conservative measures.  Hx UC on immunosuppressive therapy following with Dr Moore at [facility].  Was treated at that ov with augmentin and steroid inhaler.  Avoided oral steroids due to UC.    Cough has improved but not completely resolved.  nonsmoker.  No hx seasonal allergies but hx chronic sinusitis with surgery recently,  cough worse with deep inspiration with laughing and talking for longer periods.  Denies symptoms related to acid reflux    discussed however this could be a trigger as well.    No SOB.   Triggers are mint, toothpaste, deep inspiration.  Having coughing \"fit\"   using dulera at hs to help her sleep.     Patient Active Problem List   Diagnosis    Celiac disease (HCC)    Left sided colitis with rectal bleeding (HCC)    Regional enteritis of unspecified site    Low vitamin B12 level    Contraceptive surveillance    Immunosuppression (HCC)    Iron deficiency anemia    Ulcerative (chronic) proctitis (HCC)    Vertigo    Irregular menses    Extremely dense tissue of both breasts on mammography     Current Outpatient Medications   Medication Sig Dispense Refill    Mometasone Furo-Formoterol Fum (DULERA) 100-5 MCG/ACT Inhalation Aerosol Inhale 2 Inhalations into the lungs in the morning and 2 Inhalations before bedtime. 3 each 1    predniSONE 20 MG Oral Tab Take 2 tablets (40 mg total) by mouth daily for 5 days, THEN 1 tablet (20 mg total) daily for 5 days. 15 tablet 0    MAGNESIUM OR Take 1 tablet by mouth daily.      Cyanocobalamin (VITAMIN B 12 OR) Take 1 tablet by mouth daily.      ergocalciferol 1.25 MG (01846 UT) Oral Cap Take 1 capsule (50,000 Units total) by mouth every 7 days.      Vedolizumab (ENTYVIO IV) Inject 1 Dose into the vein As  Directed.      Hydrocortisone Ace-Pramoxine 2.5-1 % Rectal Cream   3      Past Medical History:    Abdominal pain    Abnormal uterine bleeding    Anemia    Azotemia    Celiac disease (HCC)    Closed nondisplaced fracture of head of right radius, subsequent encounter    Colitis    Contact dermatitis and other eczema, due to unspecified cause    Exacerbation of ulcerative colitis with complication (HCC)    Hemorrhage of gastrointestinal tract    Hemorrhage of rectum and anus    Ileus of unspecified type (HCC)    Leukocytosis    Low vitamin B12 level    monthly b12 injections    Pap smear for cervical cancer screening    negative    Plantar warts    Ulcerative colitis (HCC)    Visual impairment    contacts    Wears glasses      Social History:  Social History     Socioeconomic History    Marital status:    Tobacco Use    Smoking status: Former     Current packs/day: 0.00     Types: Cigarettes     Quit date:      Years since quittin.4    Smokeless tobacco: Never   Vaping Use    Vaping status: Never Used   Substance and Sexual Activity    Alcohol use: Yes     Alcohol/week: 2.0 standard drinks of alcohol     Types: 2 Glasses of wine per week     Comment: occasionally    Drug use: No    Sexual activity: Yes     Partners: Male   Other Topics Concern    Caffeine Concern Yes     Comment: 1 cup coffee    Exercise Yes     Comment: 4 days week    Seat Belt Yes     Family History   Problem Relation Age of Onset    Other (Other) Mother         autoimmune ITB    Other (Idiopathic Thrombocytopenic Purpura) Mother     Cancer Paternal Grandmother         Ovarian Cancer    Ovarian Cancer Paternal Grandmother         HER 70,S    Stroke Maternal Grandfather     Other (Cardiac Failure) Maternal Grandfather     Diabetes Maternal Grandfather     Diabetes Paternal Grandfather     Other (Other) Paternal Grandfather     Diabetes Maternal Grandfather         Allergies  Allergies   Allergen Reactions    Gluten OTHER (SEE  COMMENTS)     Celiac disease       REVIEW OF SYSTEMS:   GENERAL HEALTH: as above   RESPIRATORY: denies shortness of breath with exertion,  cough  CARDIOVASCULAR: denies chest pain on exertion, no palpatations    EXAM:   /68 (BP Location: Left arm, Patient Position: Sitting, Cuff Size: large)   Pulse 62   Temp 96.8 °F (36 °C) (Temporal)   Resp 18   Ht 5' 8.11\" (1.73 m)   Wt 166 lb (75.3 kg)   LMP 08/18/2023 (Approximate)   SpO2 99%   BMI 25.16 kg/m²   GENERAL: well developed, well nourished,in no apparent distress  LUNGS: normal rate without respiratory distress, lungs clear to auscultation  CARDIO: RRR without murmur  EXTREMITIES: no edema, normal strength and tone  PSYCH: alert and oriented x 3    ASSESSMENT AND PLAN:     Encounter Diagnosis   Name Primary?    Subacute cough  check CXR.  Increase dulera to bid.  Add allegra daily.  She is hesitant for prednisone but agrees to try a few doses.  Discussed bronchial irritation from recent URI.   To follow up if not resolved.   Yes       No orders of the defined types were placed in this encounter.      Meds & Refills for this Visit:  Requested Prescriptions     Signed Prescriptions Disp Refills    Mometasone Furo-Formoterol Fum (DULERA) 100-5 MCG/ACT Inhalation Aerosol 3 each 1     Sig: Inhale 2 Inhalations into the lungs in the morning and 2 Inhalations before bedtime.    predniSONE 20 MG Oral Tab 15 tablet 0     Sig: Take 2 tablets (40 mg total) by mouth daily for 5 days, THEN 1 tablet (20 mg total) daily for 5 days.       Imaging & Consults:  XR CHEST PA + LAT CHEST (CPT=71046)    No follow-ups on file.  There are no Patient Instructions on file for this visit.

## 2024-05-29 ENCOUNTER — HOSPITAL ENCOUNTER (OUTPATIENT)
Dept: GENERAL RADIOLOGY | Age: 44
Discharge: HOME | End: 2024-05-29
Attending: NURSE PRACTITIONER
Payer: COMMERCIAL

## 2024-05-29 ENCOUNTER — OFFICE VISIT (OUTPATIENT)
Dept: INTERNAL MEDICINE CLINIC | Facility: CLINIC | Age: 44
End: 2024-05-29

## 2024-05-29 VITALS
HEIGHT: 68.11 IN | BODY MASS INDEX: 25.16 KG/M2 | RESPIRATION RATE: 18 BRPM | DIASTOLIC BLOOD PRESSURE: 68 MMHG | OXYGEN SATURATION: 99 % | HEART RATE: 62 BPM | WEIGHT: 166 LBS | SYSTOLIC BLOOD PRESSURE: 114 MMHG | TEMPERATURE: 97 F

## 2024-05-29 DIAGNOSIS — R05.2 SUBACUTE COUGH: ICD-10-CM

## 2024-05-29 DIAGNOSIS — R05.2 SUBACUTE COUGH: Primary | ICD-10-CM

## 2024-05-29 PROCEDURE — 71046 X-RAY EXAM CHEST 2 VIEWS: CPT | Performed by: NURSE PRACTITIONER

## 2024-05-29 PROCEDURE — 3078F DIAST BP <80 MM HG: CPT | Performed by: NURSE PRACTITIONER

## 2024-05-29 PROCEDURE — 3074F SYST BP LT 130 MM HG: CPT | Performed by: NURSE PRACTITIONER

## 2024-05-29 PROCEDURE — 3008F BODY MASS INDEX DOCD: CPT | Performed by: NURSE PRACTITIONER

## 2024-05-29 PROCEDURE — G2211 COMPLEX E/M VISIT ADD ON: HCPCS | Performed by: NURSE PRACTITIONER

## 2024-05-29 PROCEDURE — 99213 OFFICE O/P EST LOW 20 MIN: CPT | Performed by: NURSE PRACTITIONER

## 2024-05-29 RX ORDER — MOMETASONE FUROATE AND FORMOTEROL FUMARATE DIHYDRATE 100; 5 UG/1; UG/1
2 AEROSOL RESPIRATORY (INHALATION) 2 TIMES DAILY
Qty: 3 EACH | Refills: 1 | Status: SHIPPED | OUTPATIENT
Start: 2024-05-29

## 2024-05-29 RX ORDER — PREDNISONE 20 MG/1
TABLET ORAL
Qty: 15 TABLET | Refills: 0 | Status: SHIPPED | OUTPATIENT
Start: 2024-05-29 | End: 2024-06-07

## 2024-05-31 ENCOUNTER — PATIENT MESSAGE (OUTPATIENT)
Dept: INTERNAL MEDICINE CLINIC | Facility: CLINIC | Age: 44
End: 2024-05-31

## 2024-05-31 DIAGNOSIS — R05.2 SUBACUTE COUGH: Primary | ICD-10-CM

## 2024-06-01 NOTE — TELEPHONE ENCOUNTER
Evelyn Iqbal, APRN  5/30/2024  6:51 AM CDT       No acute findings.  F/u if cough not resolved.       SD- Pt asking about lab orders and if any are necessary please advise

## 2024-06-01 NOTE — TELEPHONE ENCOUNTER
From: Hawa Falk  To: Evelyn Iqbal  Sent: 5/31/2024 1:59 PM CDT  Subject: Xray    Hi,    I was happy to hear the xray was clear. Do you think any bloodwork should be done too?     Thanks!  Hawa

## 2024-06-05 DIAGNOSIS — D72.819 LEUKOPENIA, UNSPECIFIED TYPE: Primary | ICD-10-CM

## 2024-06-05 LAB
ABSOLUTE BASOPHILS: 41 CELLS/UL (ref 0–200)
ABSOLUTE EOSINOPHILS: 266 CELLS/UL (ref 15–500)
ABSOLUTE LYMPHOCYTES: 1624 CELLS/UL (ref 850–3900)
ABSOLUTE MONOCYTES: 355 CELLS/UL (ref 200–950)
ABSOLUTE NEUTROPHILS: 1413 CELLS/UL (ref 1500–7800)
ALBUMIN/GLOBULIN RATIO: 1.6 (CALC) (ref 1–2.5)
ALBUMIN: 4.2 G/DL (ref 3.6–5.1)
ALKALINE PHOSPHATASE: 57 U/L (ref 31–125)
ALT: 17 U/L (ref 6–29)
AST: 17 U/L (ref 10–30)
BASOPHILS: 1.1 %
BILIRUBIN, TOTAL: 0.8 MG/DL (ref 0.2–1.2)
BUN: 13 MG/DL (ref 7–25)
CALCIUM: 9.5 MG/DL (ref 8.6–10.2)
CARBON DIOXIDE: 26 MMOL/L (ref 20–32)
CHLORIDE: 106 MMOL/L (ref 98–110)
CREATININE: 0.61 MG/DL (ref 0.5–0.99)
EGFR: 113 ML/MIN/1.73M2
EOSINOPHILS: 7.2 %
GLOBULIN: 2.6 G/DL (CALC) (ref 1.9–3.7)
GLUCOSE: 115 MG/DL (ref 65–99)
HEMATOCRIT: 42.5 % (ref 35–45)
HEMOGLOBIN: 13.9 G/DL (ref 11.7–15.5)
LYMPHOCYTES: 43.9 %
MCH: 29.5 PG (ref 27–33)
MCHC: 32.7 G/DL (ref 32–36)
MCV: 90.2 FL (ref 80–100)
MONOCYTES: 9.6 %
MPV: 9.4 FL (ref 7.5–12.5)
NEUTROPHILS: 38.2 %
PLATELET COUNT: 268 THOUSAND/UL (ref 140–400)
POTASSIUM: 4 MMOL/L (ref 3.5–5.3)
PROTEIN, TOTAL: 6.8 G/DL (ref 6.1–8.1)
RDW: 12.3 % (ref 11–15)
RED BLOOD CELL COUNT: 4.71 MILLION/UL (ref 3.8–5.1)
SODIUM: 139 MMOL/L (ref 135–146)
WHITE BLOOD CELL COUNT: 3.7 THOUSAND/UL (ref 3.8–10.8)

## 2024-06-27 ENCOUNTER — OFFICE VISIT (OUTPATIENT)
Dept: OBGYN CLINIC | Facility: CLINIC | Age: 44
End: 2024-06-27

## 2024-06-27 VITALS
BODY MASS INDEX: 24.59 KG/M2 | DIASTOLIC BLOOD PRESSURE: 78 MMHG | SYSTOLIC BLOOD PRESSURE: 116 MMHG | WEIGHT: 166 LBS | HEIGHT: 68.9 IN

## 2024-06-27 DIAGNOSIS — N95.1 PERIMENOPAUSAL SYMPTOMS: Primary | ICD-10-CM

## 2024-06-27 DIAGNOSIS — N95.2 POSTMENOPAUSAL ATROPHIC VAGINITIS: ICD-10-CM

## 2024-06-27 PROBLEM — D84.9 IMMUNOCOMPROMISED STATE (HCC): Status: ACTIVE | Noted: 2023-01-23

## 2024-06-27 PROBLEM — E55.9 VITAMIN D DEFICIENCY: Status: ACTIVE | Noted: 2020-02-18

## 2024-06-27 PROBLEM — R92.2 INCONCLUSIVE MAMMOGRAM: Status: ACTIVE | Noted: 2024-01-15

## 2024-06-27 PROBLEM — R42 DIZZINESS AND GIDDINESS: Status: ACTIVE | Noted: 2021-06-08

## 2024-06-27 PROCEDURE — 3078F DIAST BP <80 MM HG: CPT | Performed by: OBSTETRICS & GYNECOLOGY

## 2024-06-27 PROCEDURE — 99214 OFFICE O/P EST MOD 30 MIN: CPT | Performed by: OBSTETRICS & GYNECOLOGY

## 2024-06-27 PROCEDURE — 3074F SYST BP LT 130 MM HG: CPT | Performed by: OBSTETRICS & GYNECOLOGY

## 2024-06-27 PROCEDURE — 3008F BODY MASS INDEX DOCD: CPT | Performed by: OBSTETRICS & GYNECOLOGY

## 2024-06-27 RX ORDER — ESTRADIOL 0.1 MG/G
CREAM VAGINAL
Qty: 42.5 G | Refills: 3 | Status: SHIPPED | OUTPATIENT
Start: 2024-06-27

## 2024-06-27 RX ORDER — ASPIRIN 81 MG/1
81 TABLET ORAL 2 TIMES DAILY
COMMUNITY
Start: 2024-06-17 | End: 2024-07-08

## 2024-06-27 RX ORDER — NORGESTIMATE AND ETHINYL ESTRADIOL 0.25-0.035
1 KIT ORAL DAILY
Qty: 84 TABLET | Refills: 4 | Status: SHIPPED | OUTPATIENT
Start: 2024-06-27

## 2024-06-27 RX ORDER — ONDANSETRON 4 MG/1
4 TABLET, FILM COATED ORAL
COMMUNITY
Start: 2024-06-17

## 2024-06-27 RX ORDER — HYDROCODONE BITARTRATE AND ACETAMINOPHEN 7.5; 325 MG/1; MG/1
1 TABLET ORAL EVERY 4 HOURS PRN
COMMUNITY
Start: 2024-06-17

## 2024-06-27 NOTE — PROGRESS NOTES
Chief Complaint   Patient presents with    Consult         Hawa Falk is a 44 year old female who presents for menopause options. Ablation in August which worked well but having hot flashes, painful intercourse, trouble sleeping, chills.  Twirla Patch was giving patient headaches.   Birth control or HRT: 0.   Refill 0  Last Pap Smear: 2022 . Any history of abnormal paps: no hx abn         Immunization History   Administered Date(s) Administered    >=3 YRS TRI  MULTIDOSE VIAL (71579) FLU CLINIC 11/11/2014    Covid-19 Vaccine Pfizer 30 mcg/0.3 ml 02/06/2021, 03/06/2021, 08/27/2021    Covid-19 Vaccine Pfizer Bivalent 30mcg/0.3mL 10/27/2022    FLU VAC QIV SPLIT 3 YRS AND OLDER (02039) 11/28/2021    FLULAVAL 6 months & older 0.5 ml Prefilled syringe (70354) 10/25/2019    FLUZONE 6 months and older PFS 0.5 ml (29577) 11/07/2016, 11/10/2018, 10/13/2020, 11/12/2023    Fluvirin, 3 Years & >, Im 11/11/2014, 10/09/2017    HEP B 04/30/2019    HEP B, Adult 04/30/2019, 07/16/2019, 10/25/2019    Influenza 10/29/2010, 10/18/2013, 10/12/2020    Pneumococcal (Prevnar 13) 04/30/2019    Pneumovax 23 02/18/2020    TDAP 05/26/2017         Current Outpatient Medications:     aspirin 81 MG Oral Tab EC, Take 1 tablet (81 mg total) by mouth 2 (two) times daily., Disp: , Rfl:     HYDROcodone-acetaminophen 7.5-325 MG Oral Tab, Take 1 tablet by mouth every 4 (four) hours as needed for Pain., Disp: , Rfl:     ondansetron (ZOFRAN) 4 mg tablet, Take 1 tablet (4 mg total) by mouth., Disp: , Rfl:     Mometasone Furo-Formoterol Fum (DULERA) 100-5 MCG/ACT Inhalation Aerosol, Inhale 2 Inhalations into the lungs in the morning and 2 Inhalations before bedtime., Disp: 3 each, Rfl: 1    MAGNESIUM OR, Take 1 tablet by mouth daily., Disp: , Rfl:     Cyanocobalamin (VITAMIN B 12 OR), Take 1 tablet by mouth daily., Disp: , Rfl:     ergocalciferol 1.25 MG (96148 UT) Oral Cap, Take 1 capsule (50,000 Units total) by mouth every 7 days., Disp: , Rfl:      Vedolizumab (ENTYVIO IV), Inject 1 Dose into the vein As Directed., Disp: , Rfl:     Hydrocortisone Ace-Pramoxine 2.5-1 % Rectal Cream, , Disp: , Rfl: 3    Allergies   Allergen Reactions    Gluten OTHER (SEE COMMENTS)     Celiac disease       OB History    Para Term  AB Living   2 2 2 0 0 2   SAB IAB Ectopic Multiple Live Births   0 0 0 0 2      # Outcome Date GA Lbr Jules/2nd Weight Sex Type Anes PTL Lv   2 Term 05/05/10    M CS-Unspec   MAXI   1 Term 07    M CS-Unspec   MAXI       Past Medical History:    Abdominal pain    Abnormal uterine bleeding    Anemia    Azotemia    Celiac disease (HCC)    Closed nondisplaced fracture of head of right radius, subsequent encounter    Colitis    Contact dermatitis and other eczema, due to unspecified cause    Exacerbation of ulcerative colitis with complication (HCC)    Hemorrhage of gastrointestinal tract    Hemorrhage of rectum and anus    Ileus of unspecified type (HCC)    Leukocytosis    Low vitamin B12 level    monthly b12 injections    Pap smear for cervical cancer screening    negative    Plantar warts    Ulcerative colitis (HCC)    Visual impairment    contacts    Wears glasses       Past Surgical History:   Procedure Laterality Date              Colonoscopy  2010    (fiberoptic), ULCERATIVE COLITIS    Colonoscopy  2012    Complete    Colonoscopy  10/06/2008    w/biopsy  Jeff Sierra MD    Colonoscopy  2015    DR. DUFF Q2YRS    Colonoscopy  2017    Dr.Sushama Duff repeat in 3 years     Hysteroscopy,with endometrial  2023    Hysteroscopy, dilation and curettage with endometrial ablation novasure    Insert intrauterine device      Other surgical history  07 & 5/5/10    C-Sections    Remove intrauterine device      Sigmoidoscopy,diagnostic      Upper gi endoscopy performed  2015    DR. DUFF       Family History   Problem Relation Age of Onset    Other (Other) Mother          autoimmune ITB    Other (Idiopathic Thrombocytopenic Purpura) Mother     Cancer Paternal Grandmother         Ovarian Cancer    Ovarian Cancer Paternal Grandmother         HER 70,S    Stroke Maternal Grandfather     Other (Cardiac Failure) Maternal Grandfather     Diabetes Maternal Grandfather     Diabetes Paternal Grandfather     Other (Other) Paternal Grandfather     Diabetes Maternal Grandfather         Tobacco  Allergies         Social History     Socioeconomic History    Marital status:      Spouse name: Not on file    Number of children: Not on file    Years of education: Not on file    Highest education level: Not on file   Occupational History    Not on file   Tobacco Use    Smoking status: Former     Current packs/day: 0.00     Types: Cigarettes     Quit date:      Years since quittin.5    Smokeless tobacco: Never   Vaping Use    Vaping status: Never Used   Substance and Sexual Activity    Alcohol use: Yes     Alcohol/week: 2.0 standard drinks of alcohol     Types: 2 Glasses of wine per week     Comment: occasionally    Drug use: No    Sexual activity: Yes     Partners: Male   Other Topics Concern     Service Not Asked    Blood Transfusions Not Asked    Caffeine Concern Yes     Comment: 1 cup coffee    Occupational Exposure Not Asked    Hobby Hazards Not Asked    Sleep Concern Not Asked    Stress Concern Not Asked    Weight Concern Not Asked    Special Diet Not Asked    Back Care Not Asked    Exercise Yes     Comment: 4 days week    Bike Helmet Not Asked    Seat Belt Yes    Self-Exams Not Asked   Social History Narrative    Not on file     Social Determinants of Health     Financial Resource Strain: Low Risk  (2024)    Received from [facility]    Overall Financial Resource Strain (CARDIA)     Difficulty of Paying Living Expenses: Not hard at all   Food Insecurity: No Food Insecurity (2024)    Received from [facility]    Hunger  Vital Sign     Worried About Running Out of Food in the Last Year: Never true     Ran Out of Food in the Last Year: Never true   Transportation Needs: No Transportation Needs (5/29/2024)    Received from [facility]    PRAPARE - Transportation     Lack of Transportation (Medical): No     Lack of Transportation (Non-Medical): No   Physical Activity: Not on file   Stress: Not on file   Social Connections: Not on file   Housing Stability: Low Risk  (5/29/2024)    Received from [facility]    Housing Stability Vital Sign     Unable to Pay for Housing in the Last Year: No     Number of Places Lived in the Last Year: 1     In the last 12 months, was there a time when you did not have a steady place to sleep or slept in a shelter (including now)?: No     /78   Ht 5' 8.9\" (1.75 m)   Wt 166 lb (75.3 kg)   LMP 08/18/2023 (Approximate)   Wt Readings from Last 3 Encounters:   06/27/24 166 lb (75.3 kg)   05/29/24 166 lb (75.3 kg)   03/28/24 171 lb 3.2 oz (77.7 kg)     Health Maintenance   Topic Date Due    Annual Physical  Never done    Zoster Vaccines (1 of 2) Never done    COVID-19 Vaccine (5 - 2023-24 season) 09/01/2023    Mammogram  01/15/2025    Pneumococcal Vaccine: Birth to 64yrs (3 of 3 - PPSV23 or PCV20) 02/18/2025    Pap Smear  12/13/2025    Colorectal Cancer Screening  03/04/2027    DTaP,Tdap,and Td Vaccines (2 - Td or Tdap) 05/26/2027    Influenza Vaccine  Completed    Annual Depression Screening  Completed         Review of Systems   General: Present- Feeling well.  Cardiovascular: Not Present- Chest Pain, Elevated Blood Pressure, Leg Pain and/or Swelling and Shortness of Breath.  Gastrointestinal: Not Present- Nausea and Vomiting.  Female Genitourinary: Not Present- Discharge, Dysmenorrhea, Dysuria, Excessive Menstrual Bleeding and Pelvic Pain.  Musculoskeletal: Not Present- Leg Cramps and Swelling of Extremities.  Neurological: Not Present-  Headaches.  Psychiatric: Not Present- Anxiety and Depression.  All other systems negative       Physical Exam   The physical exam findings are as follows:       General   Mental Status - Alert. General Appearance - Well Developed/Well Nourished/No acute distress/ NC/AT.      Note: More than 50% of this visit was spent in counseling or coordinating care for the following reason Perimenopausal symptoms and unable to tolerate patch due to HA  .      The New Menopause by Dr. Bettie Wilson     Dietary changes, exercise and weight loss are cornerstones of management of Menopause      Check EPA list of 'Clean 15' and 'dirty dozen' while deciding to buy organic   Organic foods have less pesticides and chemical contaminants      Diet changes :   Best evidence is for whole food plant based/mediterraean diet for health.  Small portion sized meals.  Metabolism changes in menopause require 200 Kcal/day less to maintain weight    Low carb - 30-40 gm with each meal.   High Fiber 25-30 gm each day (not included in supplements)    NO Sugar, Soda, Juices and Sweets.  --- Fill half of your plate with low carb veggies and greens as discussed.  -- eat low sugar fruits apple , pears and berries : blueberries, strawberries, raspberries etc    -- avoid tropical fruits like pineapple,martha , grapes , watermelon, papaya   -- about 25% fruits and 75% veggies  to reduce sugar intake    More freshly prepared meals than frozen or take-out  More plant based foods than processed meats or foods  (try to keep ingredients <5)   Incorporate good fats - Eg :Extra virgin olive oil, Nuts, Avocado.     Consider ''Time restricted eating / feeding '' also called ''intermittent fasting'': (Resource: \"The Complete Guide to Fasting\" by Dr Cullen Rodriguez)  Eating all your small healthy meals in a narrow window 6-8 hour and only drinking water or non sugar or non carb liquids ( black tea, black coffee or green tea) rest of the time with a minimum overnight  fasting periods of 12 hours       Consider starting the following supplements:  Vit B12  (chewable or drops version as it requires salivia for activation)    Vitamin D with K2 supplementation   Magnesium L threonate or glycinate   DHA & EPA   Probiotics         Exercise :   Brisk walk 30-45 min everyday or a total of 150 min per week - Goal 12,000 steps/daily  Lift weights 3-4 times a week - Heavy - 3 Sets of 7 Heavy reps  - whole body dumbbell or machines   Core exercises (bird dog, plank with mountain climbers, dead bug) 3 sets of 10 (three - four times a week )  Balance - yoga or balance exercises     Weight loss target :   10 % with 1/2-1 lb per week over 6 to 12 months to a BMI <25    Environment/Stress:  Sleep in room with temperature <67 degrees  Cotton/natural fiber sheets and blankets  Fan   Meditation (try balance [place]) start with 1 min/daily   Avoid alcohol as can trigger hot flashes and be a source of empty calories      If None of the above changes are working to address your symptoms - follow up with us in the office.    Follow up 3 months  - start ocp and vaginal estrogen .     Discussed risks of BC  including risk of blood clots in legs, heart causing heart attack or brain causing a stroke; do not smoke while taking BC as increases risk of clots, nausea and mood disturbances;  (pt denies any family hx of blood clots); caution if any other meds started while she is on BC especially if for contraception as can interfere with efficacy of BC (especially antibiotics) and can increase risk of pregnancy; may elevate BP and would have to stop if develops HTN; may elevate triglyceride levels/cholesterol; stop if causes severe headaches/migraines, visual changes or jaundice.  May cause nausea, vomiting, spotting or breakthru bleeding; leg/ankle swelling, dark pigment rash on face, breast tenderness, intolerance to contact lenses and gallstones. Also discussed not safe against pregnancy until second pill pack  started and how to double up pill if misses a day and how to initiate first pack.     We discussed the risks and side effects associated with birth control including blood clots, nausea, mood disturbances. We talked about the importance of taking the BC at around the same time each day. We discussed what the patient should do if she misses pills. 30% of women will spot in the first month after starting and BC and 10% will in month 3. Pt to notify me if still spotting after 3 months. Patient is aware that if she has significant mood disturbances with this pill that she should notify me and we can change. Pt knows that the pill does not protect her from pregnancy in 100% and it does not protect her against STI. Pt informed that she will need to use other protection in the first month of starting the pill. She is a non-smoker, no FH of  clotting     Pt expressed understanding re: risks and benefits and would like to start med. No barriers to understanding identified.    RTO 3 months to review medication.          1. Perimenopausal symptoms    2. Postmenopausal atrophic vaginitis

## 2024-07-01 ENCOUNTER — HOSPITAL ENCOUNTER (OUTPATIENT)
Dept: MAMMOGRAPHY | Facility: HOSPITAL | Age: 44
Discharge: HOME | End: 2024-07-01
Attending: OBSTETRICS & GYNECOLOGY
Payer: COMMERCIAL

## 2024-07-01 DIAGNOSIS — R92.30 DENSE BREAST: ICD-10-CM

## 2024-07-01 PROCEDURE — 76641 ULTRASOUND BREAST COMPLETE: CPT | Performed by: OBSTETRICS & GYNECOLOGY

## 2024-07-29 ENCOUNTER — MED REC SCAN ONLY (OUTPATIENT)
Dept: INTERNAL MEDICINE CLINIC | Facility: CLINIC | Age: 44
End: 2024-07-29

## 2024-08-16 ENCOUNTER — HOSPITAL ENCOUNTER (OUTPATIENT)
Age: 44
Discharge: HOME | End: 2024-08-16
Payer: COMMERCIAL

## 2024-08-16 VITALS
BODY MASS INDEX: 24.25 KG/M2 | TEMPERATURE: 98 F | HEIGHT: 68 IN | OXYGEN SATURATION: 100 % | RESPIRATION RATE: 20 BRPM | SYSTOLIC BLOOD PRESSURE: 132 MMHG | WEIGHT: 160 LBS | DIASTOLIC BLOOD PRESSURE: 80 MMHG | HEART RATE: 61 BPM

## 2024-08-16 DIAGNOSIS — M79.81 ACHENBACH'S SYNDROME: Primary | ICD-10-CM

## 2024-08-16 RX ORDER — VEDOLIZUMAB 108 MG/.68ML
INJECTION, SOLUTION SUBCUTANEOUS
COMMUNITY
Start: 2024-08-14

## 2024-08-16 NOTE — DISCHARGE INSTRUCTIONS
- Paroxysmal hand hematoma (Achenbach Syndrome) is a skin condition characterized by spontaneous focal hemorrhage of a finger, which results in transitory localized pain, followed by rapid swelling and localized blueish discoloration   - Symptoms occur more often in women   - Symptoms primarily involve thumb, index, and middle fingers   - Symptoms typically resolve within 1-week   - Avoid excessive use of hand to promote healing   - Return to evaluation if noticing worsening pain, swelling, \"streaking\" as discussed

## 2024-08-16 NOTE — ED PROVIDER NOTES
History     Chief Complaint   Patient presents with    Skin Problem       Subjective:   HPI    Hawa Falk, 44 year old female with notable medical history of celiac disease, ROSI, UC who presents with color change to Right middle finger. Patient reports noticing her Right middle finger started to become deep red / purple yesterday, with spreading of lighter erythema to distal and proximal finger today. Denies known precipitating events, and reports color is lighter today than yesterday. Denies other complaints / concerns, itching, fever, chills.         Objective:   Past Medical History:    Abdominal pain    Abnormal uterine bleeding    Anemia    Azotemia    Celiac disease (HCC)    Closed nondisplaced fracture of head of right radius, subsequent encounter    Colitis    Contact dermatitis and other eczema, due to unspecified cause    Exacerbation of ulcerative colitis with complication (HCC)    Hemorrhage of gastrointestinal tract    Hemorrhage of rectum and anus    Ileus of unspecified type (HCC)    Leukocytosis    Low vitamin B12 level    monthly b12 injections    Pap smear for cervical cancer screening    negative    Plantar warts    Ulcerative colitis (HCC)    Visual impairment    contacts    Wears glasses              Past Surgical History:   Procedure Laterality Date              Colonoscopy  2010    (fiberoptic), ULCERATIVE COLITIS    Colonoscopy  2012    Complete    Colonoscopy  10/06/2008    w/biopsy  Jeff Sierra MD    Colonoscopy  2015    DR. DUFF Q2YRS    Colonoscopy  2017    Dr.Sushama Duff repeat in 3 years     Hysteroscopy,with endometrial  2023    Hysteroscopy, dilation and curettage with endometrial ablation novasure    Insert intrauterine device      Other surgical history  07 & 5/5/10    C-Sections    Remove intrauterine device      Sigmoidoscopy,diagnostic      Upper gi endoscopy performed  2015    DR. DUFF                 Social History     Socioeconomic History    Marital status:    Tobacco Use    Smoking status: Former     Current packs/day: 0.00     Types: Cigarettes     Quit date:      Years since quittin.6    Smokeless tobacco: Never   Vaping Use    Vaping status: Never Used   Substance and Sexual Activity    Alcohol use: Yes     Alcohol/week: 2.0 standard drinks of alcohol     Types: 2 Glasses of wine per week     Comment: occasionally    Drug use: No    Sexual activity: Yes     Partners: Male   Other Topics Concern    Caffeine Concern Yes     Comment: 1 cup coffee    Exercise Yes     Comment: 4 days week    Seat Belt Yes     Social Determinants of Health     Financial Resource Strain: Low Risk  (2024)    Received from [facility]    Overall Financial Resource Strain (CARDIA)     Difficulty of Paying Living Expenses: Not hard at all   Food Insecurity: No Food Insecurity (2024)    Received from [facility]    Hunger Vital Sign     Worried About Running Out of Food in the Last Year: Never true     Ran Out of Food in the Last Year: Never true   Transportation Needs: No Transportation Needs (2024)    Received from [facility]    PRAPARE - Transportation     Lack of Transportation (Medical): No     Lack of Transportation (Non-Medical): No   Housing Stability: Low Risk  (2024)    Received from [facility]    Housing Stability Vital Sign     Unable to Pay for Housing in the Last Year: No     Number of Places Lived in the Last Year: 1     In the last 12 months, was there a time when you did not have a steady place to sleep or slept in a shelter (including now)?: No              No current facility-administered medications on file prior to encounter.     Current Outpatient Medications on File Prior to Encounter   Medication Sig Dispense Refill    ENTYVIO 108 MG/0.68ML Subcutaneous Solution Pen-injector        estradiol (ESTRACE) 0.1 MG/GM Vaginal Cream Place 1 g vaginally every evening for 14 days, THEN 1 g twice a week. 42.5 g 3    MAGNESIUM OR Take 1 tablet by mouth daily.      Cyanocobalamin (VITAMIN B 12 OR) Take 1 tablet by mouth daily.      ergocalciferol 1.25 MG (34865 UT) Oral Cap Take 1 capsule (50,000 Units total) by mouth every 7 days.      [] aspirin 81 MG Oral Tab EC Take 1 tablet (81 mg total) by mouth 2 (two) times daily.      HYDROcodone-acetaminophen 7.5-325 MG Oral Tab Take 1 tablet by mouth every 4 (four) hours as needed for Pain.      ondansetron (ZOFRAN) 4 mg tablet Take 1 tablet (4 mg total) by mouth. (Patient not taking: Reported on 2024)      Norgestimate-Eth Estradiol (SPRINTEC 28) 0.25-35 MG-MCG Oral Tab Take 1 tablet by mouth daily. (Patient not taking: Reported on 2024) 84 tablet 4    Mometasone Furo-Formoterol Fum (DULERA) 100-5 MCG/ACT Inhalation Aerosol Inhale 2 Inhalations into the lungs in the morning and 2 Inhalations before bedtime. (Patient not taking: Reported on 2024) 3 each 1    [] predniSONE 20 MG Oral Tab Take 2 tablets (40 mg total) by mouth daily for 5 days, THEN 1 tablet (20 mg total) daily for 5 days. 15 tablet 0    Vedolizumab (ENTYVIO IV) Inject 1 Dose into the vein As Directed. (Patient not taking: Reported on 2024)      Hydrocortisone Ace-Pramoxine 2.5-1 % Rectal Cream   3         Review of Systems   Skin:  Positive for color change.   All other systems reviewed and are negative.        Constitutional and vital signs reviewed.      All other systems reviewed and negative except as noted above.    I have reviewed the family history, social history, allergies, and outpatient medications.     History reviewed from EMR: Encounters, problem list, allergies, medications      Physical Exam     ED Triage Vitals [24 1535]   /80   Pulse 61   Resp 20   Temp 97.6 °F (36.4 °C)   Temp src Temporal   SpO2 100 %   O2 Device None (Room air)        Current:/80   Pulse 61   Temp 97.6 °F (36.4 °C) (Temporal)   Resp 20   Ht 172.7 cm (5' 8\")   Wt 72.6 kg   LMP 08/18/2023 (Approximate)   SpO2 100%   BMI 24.33 kg/m²       Physical Exam  Vitals and nursing note reviewed.   Constitutional:       General: She is not in acute distress.     Appearance: Normal appearance. She is normal weight. She is not ill-appearing or toxic-appearing.   HENT:      Head: Normocephalic and atraumatic.      Right Ear: External ear normal.      Left Ear: External ear normal.      Nose: Nose normal.      Mouth/Throat:      Mouth: Mucous membranes are moist.   Eyes:      Extraocular Movements: Extraocular movements intact.      Conjunctiva/sclera: Conjunctivae normal.      Pupils: Pupils are equal, round, and reactive to light.   Cardiovascular:      Rate and Rhythm: Normal rate.      Pulses: Normal pulses.   Pulmonary:      Effort: Pulmonary effort is normal. No respiratory distress.   Musculoskeletal:         General: No swelling, tenderness or signs of injury. Normal range of motion.      Right hand: No swelling or tenderness. Normal range of motion.      Cervical back: Normal range of motion and neck supple.   Skin:     General: Skin is warm and dry.      Capillary Refill: Capillary refill takes less than 2 seconds.      Coloration: Skin is not jaundiced.      Findings: Erythema present.      Comments: Erythema to dorsum Right middle finger (distal phalanx to MCP joint) w/o gross swelling or signs of injury. CMS intact throughout. Palmar surface of finger or other fingers / main hand not involved.   Neurological:      General: No focal deficit present.      Mental Status: She is alert and oriented to person, place, and time. Mental status is at baseline.   Psychiatric:         Mood and Affect: Mood normal.         Behavior: Behavior normal.         Thought Content: Thought content normal.         Judgment: Judgment normal.            ED Course     Labs Reviewed - No  data to display  No orders to display       Vitals:    08/16/24 1535   BP: 132/80   Pulse: 61   Resp: 20   Temp: 97.6 °F (36.4 °C)   TempSrc: Temporal   SpO2: 100%   Weight: 72.6 kg   Height: 172.7 cm (5' 8\")            [facility]        Hawa Falk, 44 year old female with medical history as noted above who presents with finer discoloration   - Patient in NAD, VSS   - unknown trauma vs Achenbach syndrome vs bug bite vs cellulitis vs other   - Given lack of trauma, Xrays unlikely to provide diagnostic benefit   - No breaks in skin noted   - Exam most c/w Achenbach Syndrome (paroxysmal hand hematoma)   - Reassurance provided   - Supportive care discussed   - f/u with care team as needed       ** Concerning co-morbidities possibly affecting complaint / care: n/a     ** See ED course below for additional information on care provided / interventions / notable events throughout patient's encounter.         ** I have independently reviewed the radiology images, clinical lab results, and ECG tracings as described above (if applicable)    ** See below for home care instructions (if applicable)          Medical Decision Making  Risk  OTC drugs.        Disposition and Plan     Clinical Impression:  1. Achenbach's syndrome         Disposition:  Discharge  8/16/2024  4:06 pm    Follow-up:  No follow-up provider specified.        Medications Prescribed:  Discharge Medication List as of 8/16/2024  4:07 PM          The above patient (and/or guardian) was made aware that an appropriate evaluation has been performed, and that no additional testing is required at this time. In my medical judgment, there is currently no evidence of an immediate life-threatening or surgical condition, therefore discharge is indicated at this time. The patient (and/or guardian) was advised that a small risk still exists that a serious condition could develop. The patient was instructed to arrange close follow-up with their primary care provider (or the  referral provider given today). The patient received written and verbal instructions regarding their condition / concerns, demonstrated understanding, and is agreement with the outpatient treatment plan.        Home care instructions:     - Paroxysmal hand hematoma (Achenbach Syndrome) is a skin condition characterized by spontaneous focal hemorrhage of a finger, which results in transitory localized pain, followed by rapid swelling and localized blueish discoloration   - Symptoms occur more often in women   - Symptoms primarily involve thumb, index, and middle fingers   - Symptoms typically resolve within 1-week   - Avoid excessive use of hand to promote healing   - Return to evaluation if noticing worsening pain, swelling, \"streaking\" as discussed      Cesar Cortez, DNP, APRN, AGACNP-BC, FNP-C, CNL  Adult-Gerontology Acute Care & Family Nurse Practitioner  [facility]

## 2024-09-09 ENCOUNTER — TELEPHONE (OUTPATIENT)
Dept: INTERNAL MEDICINE CLINIC | Facility: CLINIC | Age: 44
End: 2024-09-09

## 2024-09-09 DIAGNOSIS — Z00.00 ROUTINE GENERAL MEDICAL EXAMINATION AT A HEALTH CARE FACILITY: Primary | ICD-10-CM

## 2024-09-09 NOTE — TELEPHONE ENCOUNTER
Future Appointments   Date Time Provider Department Center   10/3/2024  2:00 PM Evelyn Iqbal APRN [facility]     Orders to quest-  Pt informed that labs need to be completed no sooner than 2 weeks prior to the appt. Pt aware to fast-no call back required

## 2024-10-01 ENCOUNTER — ORDER TRANSCRIPTION (OUTPATIENT)
Dept: ADMINISTRATIVE | Facility: HOSPITAL | Age: 44
End: 2024-10-01

## 2024-10-01 DIAGNOSIS — Z13.6 SCREENING FOR CARDIOVASCULAR CONDITION: Primary | ICD-10-CM

## 2024-10-03 ENCOUNTER — HOSPITAL ENCOUNTER (OUTPATIENT)
Dept: CT IMAGING | Facility: HOSPITAL | Age: 44
Discharge: HOME | End: 2024-10-03
Attending: INTERNAL MEDICINE

## 2024-10-03 VITALS
SYSTOLIC BLOOD PRESSURE: 98 MMHG | HEIGHT: 68 IN | DIASTOLIC BLOOD PRESSURE: 60 MMHG | WEIGHT: 160 LBS | BODY MASS INDEX: 24.25 KG/M2

## 2024-10-03 DIAGNOSIS — Z13.6 SCREENING FOR CARDIOVASCULAR CONDITION: ICD-10-CM

## 2024-10-03 LAB
GLUCOSE POC: 115 MG/DL (ref 70–100)
HDL POC: 49 MG/DL (ref 40–60)
LDL POC: 86 MG/DL (ref 0–130)
TC/HDL RATIO: 4 (ref 0–5)
TOTAL CHOLESTEROL POC: 171 MG/DL (ref 0–200)
TRIGLYCERIDES POC: 184 MG/DL (ref 0–200)

## 2024-10-03 NOTE — PROGRESS NOTES
Date of Service 10/3/2024    KAITLYN PADILLA  Date of Birth 6/7/1980    Patient Age: 44 year old    PCP: Adam Schriedel, MD  [facility] [address]  [address] [place] [id number]    Heart Scan Consult  Preliminary Heart Scan Score: 0    Previous Screening  Heart Scan Completed Previously: No        Peripheral Vascular Scan Completed Previously: No          Risk Factors  Personal Risk Factors  Non-alterable Risk Factors: Age  Alterable Risk Factors: Abnormal Cholesterol;Pre-Diabetes      Body Mass Index  Body mass index is 24.33 kg/m².    Blood Pressure     BP 98/60     (Normal =< 120/80,  Elevated = 120-129/ >80,  High Stage1 130-139/80-89 , Stage2 >140/>90)    Lipid Profile  Patient was in fasting state: Yes    Cholesterol: 171, done on 10/3/2024.  HDL Cholesterol: 49, done on 10/3/2024.  LDL Cholesterol: 86, done on 10/3/2024.  TriGlycerides 184, done on 10/3/2024.    Cholesterol Goals  Value   Total  =< 200   HDL  = > 45 Men = > 55 Women   LDL   =< 100   Triglycerides  =< 150       Glucose and Hemoglobin A1C  Lab Results   Component Value Date     (A) 10/03/2024     (Normal Fasting Glucose < 100mg/dl )    Nurse Review  Risk factor information and results reviewed with Nurse: Yes    Recommended Follow Up:  Consult your physician regarding:: Final Heart Scan Report;Discuss potential for Incidental Finding;Discuss Potential for Score Variance;Glucose (Fasting)      Recommendations for Change:  Nutrition Changes: Low Saturated Fat;Low Fat Dairy;Low Salt Eating    Cholesterol Modification (goal of therapy depends upon your risk): Increase HDL (Healthy/Good) Normal >45 Men >55 Women;Decrease Triglycerides (Ugly) Normal <150    Exercise:  (walks daily and weight training 2-3 times a week)      Repeat Heart Scan: 5 years if Calcium Score is 0.0;Discuss with your Physician              [facility] Recommended Resources:  Recommended Resources: Upcoming Classes, Medical Services and Health Library [email or web address]             Dede MEMBRENO, RN        Please Contact the Nurse Heart Line with any Questions or Concerns [phone].

## 2024-11-02 ENCOUNTER — IMMUNIZATION (OUTPATIENT)
Dept: LAB | Age: 44
End: 2024-11-02
Attending: EMERGENCY MEDICINE
Payer: COMMERCIAL

## 2024-11-02 DIAGNOSIS — Z23 NEED FOR VACCINATION: Primary | ICD-10-CM

## 2024-11-02 PROCEDURE — 90471 IMMUNIZATION ADMIN: CPT

## 2024-11-02 PROCEDURE — 90656 IIV3 VACC NO PRSV 0.5 ML IM: CPT

## 2025-02-17 ENCOUNTER — OFFICE VISIT (OUTPATIENT)
Dept: FAMILY MEDICINE CLINIC | Facility: CLINIC | Age: 45
End: 2025-02-17
Payer: COMMERCIAL

## 2025-02-17 ENCOUNTER — HOSPITAL ENCOUNTER (OUTPATIENT)
Dept: MAMMOGRAPHY | Age: 45
Discharge: HOME | End: 2025-02-17
Attending: OBSTETRICS & GYNECOLOGY
Payer: COMMERCIAL

## 2025-02-17 VITALS
DIASTOLIC BLOOD PRESSURE: 72 MMHG | RESPIRATION RATE: 16 BRPM | TEMPERATURE: 98 F | BODY MASS INDEX: 26 KG/M2 | HEART RATE: 89 BPM | SYSTOLIC BLOOD PRESSURE: 106 MMHG | OXYGEN SATURATION: 99 % | WEIGHT: 168.31 LBS

## 2025-02-17 DIAGNOSIS — R51.9 ACUTE NONINTRACTABLE HEADACHE, UNSPECIFIED HEADACHE TYPE: Primary | ICD-10-CM

## 2025-02-17 DIAGNOSIS — Z12.31 ENCOUNTER FOR SCREENING MAMMOGRAM FOR BREAST CANCER: ICD-10-CM

## 2025-02-17 LAB
OPERATOR ID: NORMAL
POCT LOT NUMBER: NORMAL
RAPID SARS-COV-2 BY PCR: NOT DETECTED

## 2025-02-17 PROCEDURE — 77067 SCR MAMMO BI INCL CAD: CPT | Performed by: OBSTETRICS & GYNECOLOGY

## 2025-02-17 PROCEDURE — 77063 BREAST TOMOSYNTHESIS BI: CPT | Performed by: OBSTETRICS & GYNECOLOGY

## 2025-02-17 NOTE — PATIENT INSTRUCTIONS
Your rapid covid was negative in the office.   Monitor for the next few days.   If sinus symptoms worsen or persist, you may wish to start the antibiotics for sinusitis.   Take antibiotics with food and plenty of water.   Eat yogurt or take probiotic daily. (Probiotic-10 by [employer] Marifer is a good example of an OTC probiotic)  Make sure to finish the entire antibiotic treatment.  Increase fluids and rest.     Use OTC meds for comfort as needed--  Ibuprofen/Tylenol for fever/pain  Use Benadryl at bedtime to reduce drainage and promote rest.  Zyrtec/Claritin/Allegra in the AM to reduce nasal drainage without sedation.   Use saline nasal sprays to reduce congestion and thin secretions.   Use Delsym for cough.   Consider applying adrian's vapo-rub or eucayptus oil to chest and feet at bedtime to reduce chest and nasal congestion.   Warm tea with honey, cough lozenges, vaporizers/steam etc.    If no better in 2-3 days, follow-up with your PCP for further evaluation.

## 2025-02-17 NOTE — PROGRESS NOTES
CHIEF COMPLAINT:     Chief Complaint   Patient presents with    Sinus Problem     X 3 days  Sx: headache, body aches, sinus pressure  Flu/covid negative         HPI:   Hawa Falk is a 44 year old female presents to clinic with complaints of headache, sinus congestion, tooth pain on left side x 3 days.  Reports + chills, no fever, + headache, no upset stomach, no ear pain, no rash, no diarrhea, no loss of smell/taste.    COVID exposure: none known  Sick contacts: none  COVID testing: home covid/flu testing done yesterday-- neg.     Current Outpatient Medications   Medication Sig Dispense Refill    amoxicillin clavulanate 875-125 MG Oral Tab Take 1 tablet by mouth 2 (two) times daily for 10 days. 20 tablet 0    ENTYVIO 108 MG/0.68ML Subcutaneous Solution Pen-injector       HYDROcodone-acetaminophen 7.5-325 MG Oral Tab Take 1 tablet by mouth every 4 (four) hours as needed for Pain.      ondansetron (ZOFRAN) 4 mg tablet Take 1 tablet (4 mg total) by mouth. (Patient not taking: Reported on 8/16/2024)      Norgestimate-Eth Estradiol (SPRINTEC 28) 0.25-35 MG-MCG Oral Tab Take 1 tablet by mouth daily. (Patient not taking: Reported on 8/16/2024) 84 tablet 4    estradiol (ESTRACE) 0.1 MG/GM Vaginal Cream Place 1 g vaginally every evening for 14 days, THEN 1 g twice a week. 42.5 g 3    Mometasone Furo-Formoterol Fum (DULERA) 100-5 MCG/ACT Inhalation Aerosol Inhale 2 Inhalations into the lungs in the morning and 2 Inhalations before bedtime. (Patient not taking: Reported on 8/16/2024) 3 each 1    MAGNESIUM OR Take 1 tablet by mouth daily.      Cyanocobalamin (VITAMIN B 12 OR) Take 1 tablet by mouth daily.      ergocalciferol 1.25 MG (89357 UT) Oral Cap Take 1 capsule (50,000 Units total) by mouth every 7 days.      Vedolizumab (ENTYVIO IV) Inject 1 Dose into the vein As Directed. (Patient not taking: Reported on 8/16/2024)      Hydrocortisone Ace-Pramoxine 2.5-1 % Rectal Cream   3      Past Medical History:    Abdominal  pain    Abnormal uterine bleeding    Anemia    Azotemia    Celiac disease (HCC)    Closed nondisplaced fracture of head of right radius, subsequent encounter    Colitis    Contact dermatitis and other eczema, due to unspecified cause    Exacerbation of ulcerative colitis with complication (HCC)    Hemorrhage of gastrointestinal tract    Hemorrhage of rectum and anus    Ileus of unspecified type (HCC)    Leukocytosis    Low vitamin B12 level    monthly b12 injections    Pap smear for cervical cancer screening    negative    Plantar warts    Ulcerative colitis (HCC)    Visual impairment    contacts    Wears glasses      Social History:  Social History     Socioeconomic History    Marital status:    Tobacco Use    Smoking status: Former     Current packs/day: 0.00     Types: Cigarettes     Quit date:      Years since quittin.    Smokeless tobacco: Never   Vaping Use    Vaping status: Never Used   Substance and Sexual Activity    Alcohol use: Yes     Alcohol/week: 2.0 standard drinks of alcohol     Types: 2 Glasses of wine per week     Comment: occasionally    Drug use: No    Sexual activity: Yes     Partners: Male   Other Topics Concern    Caffeine Concern Yes     Comment: 1 cup coffee    Exercise Yes     Comment: 4 days week    Seat Belt Yes     Social Drivers of Health     Food Insecurity: No Food Insecurity (2024)    Received from [facility]    Hunger Vital Sign     Worried About Running Out of Food in the Last Year: Never true     Ran Out of Food in the Last Year: Never true   Transportation Needs: No Transportation Needs (2024)    Received from [facility]    PRAPARE - Transportation     Lack of Transportation (Medical): No     Lack of Transportation (Non-Medical): No   Housing Stability: Low Risk  (2024)    Received from [facility]    Housing Stability Vital Sign     Unable to Pay for Housing in the Last Year: No      Number of Places Lived in the Last Year: 1     Unstable Housing in the Last Year: No        REVIEW OF SYSTEMS:   GENERAL HEALTH: feels well otherwise, normal appetite  SKIN: denies any unusual skin lesions or rashes  HEENT: See HPI  RESPIRATORY: denies shortness of breath or wheezing  CARDIOVASCULAR: denies chest pain or palpitations   GI: denies vomiting or diarrhea  NEURO: denies dizziness or lightheadedness    EXAM:   /72   Pulse 89   Temp 97.8 °F (36.6 °C)   Resp 16   Wt 168 lb 5.1 oz (76.4 kg)   SpO2 99%   BMI 25.59 kg/m²   GENERAL: well developed, well nourished, in no apparent distress  SKIN: no rashes,no suspicious lesions  HEAD: atraumatic, normocephalic  EYES: conjunctiva clear  EARS: TM's clear, non-injected, no bulging, retraction, or fluid bilaterally  NOSE: nostrils patent, clear nasal mucus, nasal mucosa pink and boggy  THROAT: oral mucosa pink, moist. Posterior pharynx erythematous and injected. No exudates. Tonsils 2/4.  Uvula is midline.  Breath is not malodorous.  No trismus, hoarseness, muffled voice, or stridor.    NECK: supple  LUNGS: clear to auscultation bilaterally. Breathing is non labored.  CARDIO: RRR without murmur  EXTREMITIES: no cyanosis, clubbing or edema  LYMPH: no anterior cervical lymphadenopathy, no submandibular lymphadenopathy.  No  posterior cervical or occipital lymphadenopathy.    Recent Results (from the past 24 hours)   Rapid Covid-19    Collection Time: 02/17/25 11:20 AM    Specimen: Nares   Result Value Ref Range    Rapid SARS-CoV-2 by PCR Not Detected Not Detected    POCT Lot Number [id number]     POCT Expiration Date 08-     POCT Procedure Control Control Valid Control Valid     ,156            ASSESSMENT AND PLAN:     Encounter Diagnosis   Name Primary?    Acute nonintractable headache, unspecified headache type Yes       Orders Placed This Encounter   Procedures    Rapid Covid-19       Meds & Refills for this Visit:  Requested  Prescriptions     Signed Prescriptions Disp Refills    amoxicillin clavulanate 875-125 MG Oral Tab 20 tablet 0     Sig: Take 1 tablet by mouth 2 (two) times daily for 10 days.       Imaging & Consults:  None     Testing as above.  Comfort measures explained.       Follow up with PCP in 3-5 days if not improving, condition worsens, or fever greater than or equal to 100.4 persists for 72 hours.    Verbalized understanding.    Patient Instructions   Your rapid covid was negative in the office.   Monitor for the next few days.   If sinus symptoms worsen or persist, you may wish to start the antibiotics for sinusitis.   Take antibiotics with food and plenty of water.   Eat yogurt or take probiotic daily. (Probiotic-10 by [employer] Marifer is a good example of an OTC probiotic)  Make sure to finish the entire antibiotic treatment.  Increase fluids and rest.     Use OTC meds for comfort as needed--  Ibuprofen/Tylenol for fever/pain  Use Benadryl at bedtime to reduce drainage and promote rest.  Zyrtec/Claritin/Allegra in the AM to reduce nasal drainage without sedation.   Use saline nasal sprays to reduce congestion and thin secretions.   Use Delsym for cough.   Consider applying adrian's vapo-rub or eucayptus oil to chest and feet at bedtime to reduce chest and nasal congestion.   Warm tea with honey, cough lozenges, vaporizers/steam etc.    If no better in 2-3 days, follow-up with your PCP for further evaluation.

## 2025-03-06 ENCOUNTER — PATIENT MESSAGE (OUTPATIENT)
Dept: INTERNAL MEDICINE CLINIC | Facility: CLINIC | Age: 45
End: 2025-03-06

## 2025-03-06 NOTE — TELEPHONE ENCOUNTER
Patient called to report that she saw a Dermatologist today to be treated for a rash possible Tic bite/Lyme disease. She has been started on a 10day course of antibiotics. She is asking if she needs any associated blood work. I asked what the provider recommended and was told she did not need blood work, that if she did the treatment would be the same. I advised to finish course of abx and to follow up with derm. Patient verbalizes understanding.

## 2025-03-10 ENCOUNTER — HOSPITAL ENCOUNTER (OUTPATIENT)
Dept: MAMMOGRAPHY | Facility: HOSPITAL | Age: 45
Discharge: HOME | End: 2025-03-10
Attending: OBSTETRICS & GYNECOLOGY
Payer: COMMERCIAL

## 2025-03-10 DIAGNOSIS — R92.2 INCONCLUSIVE MAMMOGRAM: ICD-10-CM

## 2025-03-10 PROCEDURE — 77065 DX MAMMO INCL CAD UNI: CPT | Performed by: OBSTETRICS & GYNECOLOGY

## 2025-03-10 PROCEDURE — 77061 BREAST TOMOSYNTHESIS UNI: CPT | Performed by: OBSTETRICS & GYNECOLOGY

## 2025-03-14 DIAGNOSIS — R92.333 HETEROGENEOUSLY DENSE TISSUE OF BOTH BREASTS ON MAMMOGRAPHY: Primary | ICD-10-CM

## 2025-04-12 LAB — Lab: <0.9

## 2025-04-14 ENCOUNTER — MED REC SCAN ONLY (OUTPATIENT)
Dept: INTERNAL MEDICINE CLINIC | Facility: CLINIC | Age: 45
End: 2025-04-14

## 2025-04-28 ENCOUNTER — TELEPHONE (OUTPATIENT)
Dept: INTERNAL MEDICINE CLINIC | Facility: CLINIC | Age: 45
End: 2025-04-28

## 2025-05-20 ENCOUNTER — MED REC SCAN ONLY (OUTPATIENT)
Dept: INTERNAL MEDICINE CLINIC | Facility: CLINIC | Age: 45
End: 2025-05-20

## 2025-05-20 NOTE — PROGRESS NOTES
Received benign results from [facility] for punch biopsy results done on the patient on 5/9/2025 by Dr. Milagros Kwan. Placed in SD bin on desk for her review.

## 2025-06-09 ENCOUNTER — OFFICE VISIT (OUTPATIENT)
Facility: CLINIC | Age: 45
End: 2025-06-09
Payer: COMMERCIAL

## 2025-06-09 VITALS
SYSTOLIC BLOOD PRESSURE: 110 MMHG | HEIGHT: 68 IN | WEIGHT: 164 LBS | OXYGEN SATURATION: 99 % | DIASTOLIC BLOOD PRESSURE: 62 MMHG | BODY MASS INDEX: 24.86 KG/M2 | HEART RATE: 75 BPM | RESPIRATION RATE: 18 BRPM

## 2025-06-09 DIAGNOSIS — R73.03 PREDIABETES: Primary | ICD-10-CM

## 2025-06-09 PROCEDURE — 3074F SYST BP LT 130 MM HG: CPT | Performed by: STUDENT IN AN ORGANIZED HEALTH CARE EDUCATION/TRAINING PROGRAM

## 2025-06-09 PROCEDURE — 99204 OFFICE O/P NEW MOD 45 MIN: CPT | Performed by: STUDENT IN AN ORGANIZED HEALTH CARE EDUCATION/TRAINING PROGRAM

## 2025-06-09 PROCEDURE — 3008F BODY MASS INDEX DOCD: CPT | Performed by: STUDENT IN AN ORGANIZED HEALTH CARE EDUCATION/TRAINING PROGRAM

## 2025-06-09 PROCEDURE — 3078F DIAST BP <80 MM HG: CPT | Performed by: STUDENT IN AN ORGANIZED HEALTH CARE EDUCATION/TRAINING PROGRAM

## 2025-06-09 NOTE — PROGRESS NOTES
The following individual(s) verbally consented to be recorded using ambient AI listening technology and understand that they can each withdraw their consent to this listening technology at any point by asking the clinician to turn off or pause the recording:    Patient name: Hawa Falk  Additional names:

## 2025-06-09 NOTE — PATIENT INSTRUCTIONS
Visit Summary  Please repeat your A1c in 6 months with your primary     General follow up information:  Please let us know if you require any refills at least 1 week prior to your medication running out. If you do run out of medication, please call our office ASAP to request refills (do not wait until your follow up).  Please call us if you experience any problems with insurance coverage of medication, lab work, or imaging.   Lab results and imaging will typically be reviewed at follow up appointments, or within 3-5 business days of ALL results being in if you do not have an appointment scheduled in the near future. Our office will contact you for any abnormal results requiring more urgent follow up or action.  The on-call pager is for urgent matters only. If you are a type 1 diabetic and run out of insulin after business hours 8AM-4PM, you may call the on-call pager for a refill to a 24 hour pharmacy. If you have adrenal insufficiency and run out of steroids, you may call the on-call pager for a refill to a 24 hours pharmacy. All other refill requests should be requested during business hours.    Return Visit    [x] Follow up to be scheduled pending lab results    []  Fasting/8AM labs  []  Central scheduling # for ultrasound/nuclear med/CT/MRI/DXA/IR  []  Provide flyer for:  [] ENT   [] Weight Management  [] Infertility/Reproductive Endocrinology  [] Transgender care  []  Directions to 1st floor lab  [] Collect urine collection jug  [] Collect salivary cortisol tubes  []  Dental clearance form  []  Will need authorization for outside records

## 2025-06-09 NOTE — H&P
[facility]    Name: Hawa Falk    Date: 6/9/2025    Hawa Falk is a 45 year old female who presents for evaluation of prediabetes.     Chief complaint: New Patient (NP, pt here for diabetes, A1c was done on may 29, 2025 6.1 started with a rash and was told could be thyroid or diabetes. )       Subjective:   Initial HPI consult - 6/9/2025  History of Present Illness  Hawa Falk is a 45 year old female who presents with concerns about prediabetes and a persistent rash.    She is concerned about prediabetes after recent lab results showed an A1c of 6.1%. She has a history of gestational diabetes during her pregnancy in 2010, which she attributes to prednisone use. Her fasting glucose was previously recorded at 104 mg/dL after 13-14 hours of fasting. She has not been on any medication for high A1c, including metformin. Family history is significant for type 2 diabetes in her maternal grandfather. She exercises about four days a week for an hour each session and follows a diet that includes eggs, plain Greek yogurt with fruit and nuts, gluten-free bread, and home-cooked meals. She occasionally drinks Coke Zero and has a glass or two of wine on weekends. She does not smoke.    She has a persistent circular rash, which was initially suspected to be Lyme disease but was ruled out. A biopsy confirmed it as granuloma annulare. The rash is red, circular, and has not changed significantly over time. She has no history of similar rashes and no recent illness, new medications, or travel. Her thyroid function tests were performed and were normal.    Her past medical history includes ulcerative colitis, for which she has taken prednisone multiple times. She also has a history of anemia related to her ulcerative colitis, but her recent blood work showed normal counts. She has celiac disease and follows a gluten-free diet.         Previously trialed/failed DM meds: denies    History/Other:    Allergies,  PMH, SocHx and FHx reviewed and updated as appropriate in Epic on Current Medications[1]  Allergies[2]  Current Medications[3]  Past Medical History[4]  Family History[5]  Social history: Reviewed.      ROS/Exam    REVIEW OF SYSTEMS: Ten point review of systems has been performed and is otherwise negative and/or non-contributory, except as described above.     VITALS  Vitals:    06/09/25 1037   BP: 110/62   Pulse: 75   Resp: 18   SpO2: 99%   Weight: 164 lb (74.4 kg)   Height: 5' 8\" (1.727 m)       Body mass index is 24.94 kg/m².  Wt Readings from Last 6 Encounters:   06/09/25 164 lb (74.4 kg)   02/17/25 168 lb 5.1 oz (76.4 kg)   10/03/24 160 lb (72.6 kg)   08/16/24 160 lb (72.6 kg)   06/27/24 166 lb (75.3 kg)   05/29/24 166 lb (75.3 kg)       PHYSICAL EXAM  CONSTITUTIONAL:  awake, alert, cooperative, no apparent distress, and appears stated age. No acanthosis.  PSYCH: normal affect  LUNGS: breathing comfortably  CARDIOVASCULAR:  regular rate   NECK:  no palpable thyroid nodules      Physical Exam         Labs/Imaging: Pertinent imaging reviewed.    Thyroid labs (if present in chart):  Recent Labs     09/21/22  1439   TSHT4 0.78        Thyroid ultrasound results (if present in chart):  No results found.    Overall glucose control:  No results found for: \"A1C\"    Supplementary Documentation:   -Surveillance for Diabetes Complications & Risks  Foot exam/neuropathy: No data recorded  Retinopathy screening: No data recordedNo data recorded  Lipid screening:   Lab Results   Component Value Date    LDL 86 10/03/2024    TRIG 184 10/03/2024   Cholesterol Meds:       Nephropathy screening:   Lab Results   Component Value Date    EGFRCR 113 06/04/2024   No results found for: \"CREAUR\", \"MICROALBUMIN\", \"MALBCREACALC\"  Blood pressure control:   BP Readings from Last 1 Encounters:   06/09/25 110/62   BP Meds:        Assessment & Plan:   Overview:   1. Prediabetes (Primary)      Assessment & Plan  Prediabetes  A1c is 6.1%,  indicating prediabetes. She has a hx of gestational diabetes in 2010. Fasting glucose is 104 mg/dL, consistent with A1c. Recent CBC with hb WNL. No current evidence of diabetes as A1c is below 6.4% and fasting glucose is not above 126 mg/dL. No strong correlation between the rash and prediabetes as A1c is not severely elevated. Lifestyle changes are the first line of management for prediabetes. No immediate need for metformin unless A1c progresses to 6.4% or higher or remains elevated for a prolonged period. Importance of monitoring A1c every 6 to 12 months to prevent progression to diabetes was discussed. Dietary consultation for potential improvements in diet was encouraged.  - Repeat A1c in 6 months with primary care provider.  - Consider consultation with a dietitian for dietary advice               Return for Follow up with PCP.    Evelyn Gomez DO  Endocrinology, Diabetes & Metabolism   6/9/2025    Note to patient: The 21 Century Cures Act makes medical notes like these available to patients in the interest of transparency. However, be advised this is a medical document. It is intended as peer to peer communication. It is written in medical language and may contain abbreviations or verbiage that are unfamiliar. It may appear blunt or direct. Medical documents are intended to carry relevant information, facts as evident, and the clinical opinion of the practitioner.          [1]    HYDROcodone-acetaminophen 7.5-325 MG Oral Tab Take 1 tablet by mouth every 4 (four) hours as needed for Pain.      Norgestimate-Eth Estradiol (SPRINTEC 28) 0.25-35 MG-MCG Oral Tab Take 1 tablet by mouth daily. 84 tablet 4    estradiol (ESTRACE) 0.1 MG/GM Vaginal Cream Place 1 g vaginally every evening for 14 days, THEN 1 g twice a week. 42.5 g 3    MAGNESIUM OR Take 1 tablet by mouth daily.      Cyanocobalamin (VITAMIN B 12 OR) Take 1 tablet by mouth daily.      ergocalciferol 1.25 MG (92572 UT) Oral Cap Take 1 capsule (50,000  Units total) by mouth every 7 days.     [2]   Allergies  Allergen Reactions    Gluten OTHER (SEE COMMENTS)     Celiac disease   [3]   Current Outpatient Medications   Medication Sig Dispense Refill    HYDROcodone-acetaminophen 7.5-325 MG Oral Tab Take 1 tablet by mouth every 4 (four) hours as needed for Pain.      Norgestimate-Eth Estradiol (SPRINTEC 28) 0.25-35 MG-MCG Oral Tab Take 1 tablet by mouth daily. 84 tablet 4    estradiol (ESTRACE) 0.1 MG/GM Vaginal Cream Place 1 g vaginally every evening for 14 days, THEN 1 g twice a week. 42.5 g 3    MAGNESIUM OR Take 1 tablet by mouth daily.      Cyanocobalamin (VITAMIN B 12 OR) Take 1 tablet by mouth daily.      ergocalciferol 1.25 MG (13197 UT) Oral Cap Take 1 capsule (50,000 Units total) by mouth every 7 days.      Mometasone Furo-Formoterol Fum (DULERA) 100-5 MCG/ACT Inhalation Aerosol Inhale 2 Inhalations into the lungs in the morning and 2 Inhalations before bedtime. (Patient not taking: Reported on 6/9/2025) 3 each 1    Vedolizumab (ENTYVIO IV) Inject 1 Dose into the vein As Directed. (Patient not taking: Reported on 6/9/2025)     [4]   Past Medical History:   Abdominal pain    Abnormal uterine bleeding    Anemia    Azotemia    Celiac disease (HCC)    Closed nondisplaced fracture of head of right radius, subsequent encounter    Colitis    Contact dermatitis and other eczema, due to unspecified cause    Exacerbation of ulcerative colitis with complication (HCC)    Hemorrhage of gastrointestinal tract    Hemorrhage of rectum and anus    Ileus of unspecified type (HCC)    Leukocytosis    Low vitamin B12 level    monthly b12 injections    Pap smear for cervical cancer screening    negative    Plantar warts    Ulcerative colitis (HCC)    Visual impairment    contacts    Wears glasses   [5]   Family History  Problem Relation Age of Onset    Other (Other) Mother         autoimmune ITB    Other (Idiopathic Thrombocytopenic Purpura) Mother     Cancer Paternal Grandmother          Ovarian Cancer    Ovarian Cancer Paternal Grandmother         HER 70,S    Stroke Maternal Grandfather     Other (Cardiac Failure) Maternal Grandfather     Diabetes Maternal Grandfather     Diabetes Paternal Grandfather     Other (Other) Paternal Grandfather     Diabetes Maternal Grandfather

## (undated) DIAGNOSIS — R42 VERTIGO: Primary | ICD-10-CM

## (undated) DIAGNOSIS — R42 DIZZINESS: Primary | ICD-10-CM

## (undated) DIAGNOSIS — R26.9 GAIT DISTURBANCE: ICD-10-CM

## (undated) DEVICE — UTERINE ABLATOR NOVASURE

## (undated) DEVICE — Device: Brand: DEFENDO AIR/WATER/SUCTION AND BIOPSY VALVE

## (undated) DEVICE — ENDOSCOPY PACK - LOWER: Brand: MEDLINE INDUSTRIES, INC.

## (undated) DEVICE — 1016 S-DRAPE IRRIG POUCH 10/BOX: Brand: STERI-DRAPE™

## (undated) DEVICE — DRESSING PETRO 36X1IN ABS NADH

## (undated) DEVICE — MEDI-VAC SUCTION HANDLE REGULAR CAPACITY: Brand: CARDINAL HEALTH

## (undated) DEVICE — MEDI-VAC NON-CONDUCTIVE SUCTION TUBING: Brand: CARDINAL HEALTH

## (undated) DEVICE — 3M™ RED DOT™ MONITORING ELECTRODE WITH FOAM TAPE AND STICKY GEL, 50/BAG, 20/CASE, 72/PLT 2570: Brand: RED DOT™

## (undated) DEVICE — HYSTEROSCOPY: Brand: MEDLINE INDUSTRIES, INC.

## (undated) DEVICE — SOLUTION  .9 3000ML

## (undated) DEVICE — SOFT TISSUE SHAVER MINI: Brand: TRUCLEAR

## (undated) DEVICE — GAMMEX® NON-LATEX SIZE 6.5, STERILE NEOPRENE POWDER-FREE SURGICAL GLOVE: Brand: GAMMEX

## (undated) DEVICE — FILTERLINE NASAL ADULT O2/CO2

## (undated) DEVICE — FORCEP BIOPSY RJ4 LG CAP W/ND

## (undated) DEVICE — "MH-438 A/W VLVE F/140 EVIS-140": Brand: AIR/WATER VALVE

## (undated) DEVICE — Device

## (undated) DEVICE — CURAD NONADHERANT PAD 3X8

## (undated) DEVICE — 1200CC GUARDIAN II: Brand: GUARDIAN

## (undated) NOTE — LETTER
11/20/20        [address] [address]      Dear Guillermo Vásquez,    [facility] records indicate that you have outstanding lab work and or testing that was ordered for you and has not yet been completed:  Orders Placed This Encount

## (undated) NOTE — MR AVS SNAPSHOT
[facility]  [address] [address] [phone]               Thank you for choosing us for your health care visit with Den Crocker MD.  We are glad to serve you and happy to provide you with this summa Phone:  [phone]   Fax:  [phone]    Diagnoses:  Skin lesion   Order:  Emili Sampson - Internal    Amber Lowry   [address] [address]   Phone:  [phone]   Fax:  [phone]         Follow-up Instructions     Return

## (undated) NOTE — MR AVS SNAPSHOT
[facility] [address] [phone]               Thank you for choosing us for your health care visit with Dede Winn MD.  We are glad to serve you and happy to provide you with this summa from your insurance company.   Your physician or the clinic staff will work with your insurance company to obtain this authorization for your ordered radiology test.    To schedule an appointment for your radiology test please call [facility] office, you can view your past visit information in [employer] by going to Visits < Visit Summaries. [facility] questions? Call [phone] for help. [employer] is NOT to be used for urgent needs. For medical emergencies, dial 911.            Visit [facility]

## (undated) NOTE — LETTER
Patricia Arevalo [address] [address]  [place], [address] [facility]    Consent for Operation   Date: _January 3, 2019_________________                                Time: ___1600____________    1.  I authorize the performance upon Marianela Falk the following op 6. I consent to the photographing or videotaping of the operations or procedures to be performed, including appropriate portions of my body for medical, scientific, or educational purposes, provided my identity is not revealed by the pictures or by descrip 10. If I have a Do Not Resuscitate order in place, the surgeon and I (or the individual authorized to consent on my behalf) will discuss and agree as to whether the Do Not Resuscitate order will remain in effect or will be discontinued during the performan a. Allow the anesthesiologist (anesthesia doctor) to give me medicine and do additional procedures as necessary.  Some examples are: Starting or using an “IV” to give me medicine, fluids or blood during my procedure, and having a breathing tube placed to he 7. Regional Anesthesia (“spinal”, “epidural”, & “nerve blocks”): I understand that rare but potential complications include headache, bleeding, infection, seizure, irregular heart rhythms, and nerve injury.     I can change my mind about having anesthesia

## (undated) NOTE — ED AVS SNAPSHOT
[address] Tiffany Wallace   MRN: [id number]    Department:  [facility] Emergency Department   Date of Visit:  5/17/2019           Disclosure     Insurance plans vary and the physician(s) referred by the ER may not be covered by your plan.  Please contact your tell this physician (or your personal doctor if your instructions are to return to your personal doctor) about any new or lasting problems. The primary care or specialist physician will see patients referred from the [facility] Emergency Department.  Giovana Novak

## (undated) NOTE — LETTER
Patricia Arevalo [address] E  [place], [address] [facility]    Consent for Operation  Date: __January 3, 2019________________                                Time: __1600_____________    1.  I authorize the performance upon Hawa Falk the following ope procedure has been videotaped, the surgeon will obtain the original videotape. The hospital will not be responsible for storage or maintenance of this tape.   7. For the purpose of advancing medical education, I consent to the admittance of observers to the STATEMENTS REQUIRING INSERTION OR COMPLETION WERE FILLED IN.     Signature of Patient:   ___________________________    When the patient is a minor or mentally incompetent to give consent:  Signature of person authorized to consent for patient: ____________ supplements, and pills I can buy without a prescription (including street drugs/illegal medications). Failure to inform my anesthesiologist about these medicines may increase my risk of anesthetic complications. iv.  If I am allergic to anything or have ha Anesthesiologist Signature     Date   Time  I have discussed the procedure and information above with the patient (or patient’s representative) and answered their questions. The patient or their representative has agreed to have anesthesia services.     ___

## (undated) NOTE — ED AVS SNAPSHOT
[facility] [place] [address]    Phone:  [phone]    Fax:  [phone]           Akilah Falk   MRN: [id number]    Department:  THE [facility]   Date of Visit:  5 Insurance plans vary and the physician(s) referred by the Immediate Care may not be covered by your plan. Please contact your insurance company to determine coverage for follow-up care and referrals. [facility]  [address] If you have been prescribed any medication(s), please fill your prescription right away and begin taking the medication(s) as directed.     If the Immediate Care Provider has read X-rays, these will be re-interpreted by a radiologist.  If there is a signifi can help with your Affordable Care Act coverage, as well as all types of Medicaid plans. To get signed up and covered, please call [phone] and ask to get set up for an insurance coverage that is in-network with Mahnaz Clarke

## (undated) NOTE — ED AVS SNAPSHOT
Haider Chaney   MRN: [id number]    Department:  [facility] Emergency Department   Date of Visit:  2/2/2019           Disclosure     Insurance plans vary and the physician(s) referred by the ER may not be covered by your plan.  Please contact your tell this physician (or your personal doctor if your instructions are to return to your personal doctor) about any new or lasting problems. The primary care or specialist physician will see patients referred from the [facility] Emergency Department.  Christiano Schrader

## (undated) NOTE — LETTER
11/18/20        [address] [address]      Dear Martínez Alejandre,    [address] [facility] records indicate that you have outstanding lab work and or testing that was ordered for you and has not yet been completed:  Orders Placed This Encount